# Patient Record
Sex: FEMALE | HISPANIC OR LATINO | Employment: OTHER | URBAN - METROPOLITAN AREA
[De-identification: names, ages, dates, MRNs, and addresses within clinical notes are randomized per-mention and may not be internally consistent; named-entity substitution may affect disease eponyms.]

---

## 2021-01-15 DIAGNOSIS — Z23 NEED FOR VACCINATION: ICD-10-CM

## 2021-11-15 ENCOUNTER — HOSPITAL ENCOUNTER (EMERGENCY)
Facility: MEDICAL CENTER | Age: 71
End: 2021-11-15
Attending: EMERGENCY MEDICINE
Payer: MEDICAID

## 2021-11-15 VITALS
HEART RATE: 90 BPM | DIASTOLIC BLOOD PRESSURE: 78 MMHG | TEMPERATURE: 97 F | BODY MASS INDEX: 24.77 KG/M2 | OXYGEN SATURATION: 98 % | RESPIRATION RATE: 16 BRPM | SYSTOLIC BLOOD PRESSURE: 118 MMHG | WEIGHT: 145.06 LBS | HEIGHT: 64 IN

## 2021-11-15 DIAGNOSIS — N76.4 LABIAL ABSCESS: ICD-10-CM

## 2021-11-15 DIAGNOSIS — N76.2 CELLULITIS OF LABIA: ICD-10-CM

## 2021-11-15 PROCEDURE — 99284 EMERGENCY DEPT VISIT MOD MDM: CPT

## 2021-11-15 PROCEDURE — 303977 HCHG I & D

## 2021-11-15 PROCEDURE — 700102 HCHG RX REV CODE 250 W/ 637 OVERRIDE(OP): Performed by: EMERGENCY MEDICINE

## 2021-11-15 PROCEDURE — 700101 HCHG RX REV CODE 250: Performed by: EMERGENCY MEDICINE

## 2021-11-15 PROCEDURE — A9270 NON-COVERED ITEM OR SERVICE: HCPCS | Performed by: EMERGENCY MEDICINE

## 2021-11-15 RX ORDER — SULFAMETHOXAZOLE AND TRIMETHOPRIM 800; 160 MG/1; MG/1
1 TABLET ORAL ONCE
Status: COMPLETED | OUTPATIENT
Start: 2021-11-15 | End: 2021-11-15

## 2021-11-15 RX ORDER — CEPHALEXIN 500 MG/1
500 CAPSULE ORAL 4 TIMES DAILY
Qty: 40 CAPSULE | Refills: 0 | Status: SHIPPED | OUTPATIENT
Start: 2021-11-15 | End: 2021-11-25

## 2021-11-15 RX ORDER — CEPHALEXIN 500 MG/1
500 CAPSULE ORAL ONCE
Status: COMPLETED | OUTPATIENT
Start: 2021-11-15 | End: 2021-11-15

## 2021-11-15 RX ORDER — SULFAMETHOXAZOLE AND TRIMETHOPRIM 800; 160 MG/1; MG/1
1 TABLET ORAL 2 TIMES DAILY
Qty: 20 TABLET | Refills: 0 | Status: SHIPPED | OUTPATIENT
Start: 2021-11-15 | End: 2021-11-25

## 2021-11-15 RX ORDER — HYDROCODONE BITARTRATE AND ACETAMINOPHEN 5; 325 MG/1; MG/1
1 TABLET ORAL EVERY 4 HOURS PRN
Qty: 15 TABLET | Refills: 0 | Status: SHIPPED | OUTPATIENT
Start: 2021-11-15 | End: 2021-11-16 | Stop reason: SDUPTHER

## 2021-11-15 RX ORDER — LIDOCAINE HYDROCHLORIDE AND EPINEPHRINE BITARTRATE 20; .01 MG/ML; MG/ML
10 INJECTION, SOLUTION SUBCUTANEOUS ONCE
Status: COMPLETED | OUTPATIENT
Start: 2021-11-15 | End: 2021-11-15

## 2021-11-15 RX ADMIN — SULFAMETHOXAZOLE AND TRIMETHOPRIM 1 TABLET: 800; 160 TABLET ORAL at 12:27

## 2021-11-15 RX ADMIN — LIDOCAINE HYDROCHLORIDE AND EPINEPHRINE 10 ML: 20; 10 INJECTION, SOLUTION INFILTRATION; PERINEURAL at 12:30

## 2021-11-15 RX ADMIN — CEPHALEXIN 500 MG: 500 CAPSULE ORAL at 12:27

## 2021-11-15 NOTE — ED PROVIDER NOTES
"ED Provider Note    Scribed for Dg Obrien M.D. by Eileen Foreman. 11/15/2021  12:05 PM    Primary care provider: Pcp Pt States None  Means of arrival: Walk-in  History obtained from: Patient  History limited by: None    CHIEF COMPLAINT  Chief Complaint   Patient presents with    Lump     to vagina, noticed it 2 days ago, feels like it's getting bigger       HPI  Yang Galeas is a 71 y.o. female who presents to the Emergency Department for evaluation of a lump on her right labia. This started two days ago and has been increasing in size. She states that it appears filled with pus. No fevers. There are no known alleviating or exacerbating factors.     REVIEW OF SYSTEMS  Pertinent positives include right labia lump.   Pertinent negatives include no fevers.    All other systems reviewed and negative. See HPI for further details.     PAST MEDICAL HISTORY   has a past medical history of Anxiety and Asthma.    SURGICAL HISTORY   has a past surgical history that includes gyn surgery.    SOCIAL HISTORY  Social History     Tobacco Use    Smoking status: Never Smoker    Smokeless tobacco: Never Used   Substance Use Topics    Alcohol use: Yes     Comment: stopped 3 days ago    Drug use: Yes     Types: Inhaled     Comment: marijuana, stopped 3 days ago      Social History     Substance and Sexual Activity   Drug Use Yes    Types: Inhaled    Comment: marijuana, stopped 3 days ago       FAMILY HISTORY  History reviewed. No pertinent family history.    CURRENT MEDICATIONS  Home Medications       Reviewed by Lorna Luis R.N. (Registered Nurse) on 11/15/21 at 1223  Med List Status: Partial     Medication Last Dose Status        Patient Micah Taking any Medications                           ALLERGIES  No Known Allergies    PHYSICAL EXAM  VITAL SIGNS: /80   Pulse 93   Temp 36.3 °C (97.4 °F) (Temporal)   Resp 16   Ht 1.626 m (5' 4\")   Wt 65.8 kg (145 lb 1 oz)   SpO2 98%   BMI 24.90 kg/m²     Nursing note " and vitals reviewed.  Constitutional: Well-developed and well-nourished. No distress.   HENT: Head is normocephalic and atraumatic. Oropharynx is clear and moist without exudate or erythema.   Eyes: Pupils are equal, round, and reactive to light. Conjunctiva are normal.   Cardiovascular: Normal rate and regular rhythm. No murmur heard. Normal radial pulses.  Pulmonary/Chest: Breath sounds normal. No wheezes or rales.   Abdominal: Soft and non-tender. No distention    : Right labia is erythematous and swollen with palpable fluctuance, along the upper portion of the labia majora.  There is induration of the lower labia majora extending to the buttock.  There is no fluctuance in this area.  Musculoskeletal: Extremities exhibit normal range of motion without edema or tenderness.   Neurological: Awake, alert and oriented to person, place, and time. No focal deficits noted.  Skin: Skin is warm and dry. No rash.   Psychiatric: Normal mood and affect. Appropriate for clinical situation.    DIAGNOSTIC STUDIES / PROCEDURES    Incision and Drainage Procedure    Indication: Abscess    Location: Right labia majora    Procedure: The patient was positioned appropriately and the skin over the incision site was prepped with chlorhexidine. Local anesthesia was obtained by infiltration using 2% Lidocaine with epinephrine.  An incision was then made over the greatest area of fluctuance and approximately 2 cc of purulent and bloody material was expressed. Loculations were not present. There was then no significant abscess cavity to allow packing placement.  The patient tolerated the procedure well.    Complications: None    COURSE & MEDICAL DECISION MAKING  Nursing notes, VS, PMSFHx reviewed in chart.     12:05 PM - Patient seen and examined at bedside. Informed her that she will need an incision and drainage and she is agreeable.    12:17 PM - Performed incision and drainage as above. Patient will be treated with Bactrim and Keflex.  Discussed discharge instructions and return precautions with the patient and they were cleared for discharge. Patient was given the opportunity to ask any further questions. She is comfortable with discharge at this time.      Patient presents today with a right labial abscess.  This was I indeed.  There was less return of purulent material than expected.  Seems to be primarily induration.  Not able to be packed.  Local dressing is applied.  Patient is treated with Bactrim and Keflex.    I reviewed prescription monitoring program for patient's narcotic use before prescribing a scheduled drug.The patient will not drink alcohol nor drive with prescribed medications. The patient will return for new or worsening symptoms and is stable at the time of discharge.    The patient is referred to a primary physician for blood pressure management, diabetic screening, and for all other preventative health concerns.    In prescribing controlled substances to this patient, I certify that I have obtained and reviewed the medical history of Yang Galeas. I have also made a good anna effort to obtain applicable records from other providers who have treated the patient and records did not demonstrate any increased risk of substance abuse that would prevent me from prescribing controlled substances.     I have conducted a physical exam and documented it. I have reviewed Ms. Galeas’s prescription history as maintained by the [place] Prescription Monitoring Program.     I have assessed the patient’s risk for abuse, dependency, and addiction using the validated Opioid Risk Tool available at https://www.mdcalc.com/fqvcnc-ayvz-yxiy-ort-narcotic-abuse.     Given the above, I believe the benefits of controlled substance therapy outweigh the risks. The reasons for prescribing controlled substances include non-narcotic, oral analgesic alternatives have been inadequate for pain control. Accordingly, I have discussed the risk and benefits,  treatment plan, and alternative therapies with the patient.     DISPOSITION:  Patient will be discharged home in stable condition.    FOLLOW UP:  [facility], Emergency Dept  [address]  [place] [address] [phone]    If symptoms worsen      OUTPATIENT MEDICATIONS:  New Prescriptions    CEPHALEXIN (KEFLEX) 500 MG CAP    Take 1 Capsule by mouth 4 times a day for 10 days.    HYDROCODONE-ACETAMINOPHEN (NORCO) 5-325 MG TAB PER TABLET    Take 1 Tablet by mouth every four hours as needed for up to 5 days.    SULFAMETHOXAZOLE-TRIMETHOPRIM (BACTRIM DS) 800-160 MG TABLET    Take 1 Tablet by mouth 2 times a day for 10 days.       FINAL IMPRESSION  1. Labial abscess    2. Cellulitis of labia       I&D procedure performed by VALARIE.     Eileen OATES ([place]), am scribing for, and in the presence of, Dg Obrien M.D..    Electronically signed by: Eileen Cid), 11/15/2021    Dg OATES M.D. personally performed the services described in this documentation, as scribed by Eileen Foreman in my presence, and it is both accurate and complete.    The note accurately reflects work and decisions made by me.  Dg Obrien M.D.  11/15/2021  1:54 PM

## 2021-11-15 NOTE — ED TRIAGE NOTES
Ambulates to triage  Chief Complaint   Patient presents with   • Lump     to vagina, noticed it 2 days ago, feels like it's getting bigger     Pt said it started out looking like a pimple, but has gotten larger and looks like it's filled with pus. No drainage per pt.

## 2021-11-16 RX ORDER — HYDROCODONE BITARTRATE AND ACETAMINOPHEN 5; 325 MG/1; MG/1
1 TABLET ORAL EVERY 4 HOURS PRN
Qty: 15 TABLET | Refills: 0 | Status: SHIPPED | OUTPATIENT
Start: 2021-11-16 | End: 2021-11-21

## 2022-02-27 ENCOUNTER — HOSPITAL ENCOUNTER (EMERGENCY)
Facility: MEDICAL CENTER | Age: 72
End: 2022-02-27
Attending: EMERGENCY MEDICINE
Payer: MEDICAID

## 2022-02-27 ENCOUNTER — APPOINTMENT (OUTPATIENT)
Dept: RADIOLOGY | Facility: MEDICAL CENTER | Age: 72
End: 2022-02-27
Attending: EMERGENCY MEDICINE
Payer: MEDICAID

## 2022-02-27 VITALS
TEMPERATURE: 98 F | DIASTOLIC BLOOD PRESSURE: 69 MMHG | OXYGEN SATURATION: 97 % | HEIGHT: 61 IN | WEIGHT: 157 LBS | BODY MASS INDEX: 29.64 KG/M2 | HEART RATE: 83 BPM | SYSTOLIC BLOOD PRESSURE: 136 MMHG | RESPIRATION RATE: 16 BRPM

## 2022-02-27 DIAGNOSIS — M25.552 LEFT HIP PAIN: ICD-10-CM

## 2022-02-27 DIAGNOSIS — M25.512 ACUTE PAIN OF LEFT SHOULDER: ICD-10-CM

## 2022-02-27 DIAGNOSIS — Z59.00 HOMELESSNESS: ICD-10-CM
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
## 2022-02-27 LAB
ALBUMIN SERPL BCP-MCNC: 4.1 G/DL (ref 3.2–4.9)
ALBUMIN/GLOB SERPL: 1.3 G/DL
ALP SERPL-CCNC: 108 U/L (ref 30–99)
ALT SERPL-CCNC: 13 U/L (ref 2–50)
ANION GAP SERPL CALC-SCNC: 13 MMOL/L (ref 7–16)
APPEARANCE UR: CLEAR
AST SERPL-CCNC: 24 U/L (ref 12–45)
BACTERIA #/AREA URNS HPF: NEGATIVE /HPF
BASOPHILS # BLD AUTO: 0.5 % (ref 0–1.8)
BASOPHILS # BLD: 0.05 K/UL (ref 0–0.12)
BILIRUB SERPL-MCNC: 0.5 MG/DL (ref 0.1–1.5)
BILIRUB UR QL STRIP.AUTO: NEGATIVE
BUN SERPL-MCNC: 25 MG/DL (ref 8–22)
CALCIUM SERPL-MCNC: 9.3 MG/DL (ref 8.5–10.5)
CHLORIDE SERPL-SCNC: 105 MMOL/L (ref 96–112)
CO2 SERPL-SCNC: 22 MMOL/L (ref 20–33)
COLOR UR: YELLOW
CREAT SERPL-MCNC: 0.87 MG/DL (ref 0.5–1.4)
EKG IMPRESSION: NORMAL
EOSINOPHIL # BLD AUTO: 0.25 K/UL (ref 0–0.51)
EOSINOPHIL NFR BLD: 2.6 % (ref 0–6.9)
EPI CELLS #/AREA URNS HPF: NEGATIVE /HPF
ERYTHROCYTE [DISTWIDTH] IN BLOOD BY AUTOMATED COUNT: 43.9 FL (ref 35.9–50)
GLOBULIN SER CALC-MCNC: 3.1 G/DL (ref 1.9–3.5)
GLUCOSE SERPL-MCNC: 81 MG/DL (ref 65–99)
GLUCOSE UR STRIP.AUTO-MCNC: NEGATIVE MG/DL
HCT VFR BLD AUTO: 38 % (ref 37–47)
HGB BLD-MCNC: 12.5 G/DL (ref 12–16)
HYALINE CASTS #/AREA URNS LPF: ABNORMAL /LPF
IMM GRANULOCYTES # BLD AUTO: 0.03 K/UL (ref 0–0.11)
IMM GRANULOCYTES NFR BLD AUTO: 0.3 % (ref 0–0.9)
KETONES UR STRIP.AUTO-MCNC: NEGATIVE MG/DL
LEUKOCYTE ESTERASE UR QL STRIP.AUTO: ABNORMAL
LYMPHOCYTES # BLD AUTO: 3.06 K/UL (ref 1–4.8)
LYMPHOCYTES NFR BLD: 32.1 % (ref 22–41)
MCH RBC QN AUTO: 29.4 PG (ref 27–33)
MCHC RBC AUTO-ENTMCNC: 32.9 G/DL (ref 33.6–35)
MCV RBC AUTO: 89.4 FL (ref 81.4–97.8)
MICRO URNS: ABNORMAL
MONOCYTES # BLD AUTO: 1.06 K/UL (ref 0–0.85)
MONOCYTES NFR BLD AUTO: 11.1 % (ref 0–13.4)
NEUTROPHILS # BLD AUTO: 5.07 K/UL (ref 2–7.15)
NEUTROPHILS NFR BLD: 53.4 % (ref 44–72)
NITRITE UR QL STRIP.AUTO: NEGATIVE
NRBC # BLD AUTO: 0 K/UL
NRBC BLD-RTO: 0 /100 WBC
PH UR STRIP.AUTO: 5.5 [PH] (ref 5–8)
PLATELET # BLD AUTO: 252 K/UL (ref 164–446)
PMV BLD AUTO: 9.8 FL (ref 9–12.9)
POTASSIUM SERPL-SCNC: 3.9 MMOL/L (ref 3.6–5.5)
PROT SERPL-MCNC: 7.2 G/DL (ref 6–8.2)
PROT UR QL STRIP: NEGATIVE MG/DL
RBC # BLD AUTO: 4.25 M/UL (ref 4.2–5.4)
RBC # URNS HPF: ABNORMAL /HPF
RBC UR QL AUTO: NEGATIVE
SODIUM SERPL-SCNC: 140 MMOL/L (ref 135–145)
SP GR UR STRIP.AUTO: 1.03
UROBILINOGEN UR STRIP.AUTO-MCNC: 0.2 MG/DL
WBC # BLD AUTO: 9.5 K/UL (ref 4.8–10.8)
WBC #/AREA URNS HPF: ABNORMAL /HPF

## 2022-02-27 PROCEDURE — 99284 EMERGENCY DEPT VISIT MOD MDM: CPT

## 2022-02-27 PROCEDURE — 93005 ELECTROCARDIOGRAM TRACING: CPT | Performed by: EMERGENCY MEDICINE

## 2022-02-27 PROCEDURE — 85025 COMPLETE CBC W/AUTO DIFF WBC: CPT

## 2022-02-27 PROCEDURE — 36415 COLL VENOUS BLD VENIPUNCTURE: CPT

## 2022-02-27 PROCEDURE — 73030 X-RAY EXAM OF SHOULDER: CPT | Mod: LT

## 2022-02-27 PROCEDURE — 80053 COMPREHEN METABOLIC PANEL: CPT

## 2022-02-27 PROCEDURE — 72170 X-RAY EXAM OF PELVIS: CPT

## 2022-02-27 PROCEDURE — 81001 URINALYSIS AUTO W/SCOPE: CPT

## 2022-02-27 SDOH — ECONOMIC STABILITY - HOUSING INSECURITY: HOMELESSNESS UNSPECIFIED: Z59.00

## 2022-02-27 ASSESSMENT — LIFESTYLE VARIABLES: DO YOU DRINK ALCOHOL: NO

## 2022-02-27 NOTE — ED TRIAGE NOTES
"Chief Complaint   Patient presents with   • T-5000 FALL     Pt reports that she fell Monday landing on left hip, since she has had pain. Reports that she has been in bed since but is able to walk with a cane for shirt distances.      Pt wheeled to triage with above complaint.    /85   Pulse 96   Temp 36.7 °C (98.1 °F) (Temporal)   Resp 16   Ht 1.549 m (5' 1\")   Wt 71.2 kg (157 lb)   SpO2 95%   Pt informed of wait times. Educated on triage process.  Asked to return to triage RN for any new or worsening of symptoms. Thanked for patience.        "

## 2022-02-28 NOTE — ED NOTES
Patient to [facility] via wheelchair with granddaughter at bedside.  Patient reports slipped and fell on ice resulting in L hip pain; patient has cane at bedside, requested education for cane use, patient and family educated on use, reports understanding, requesting FWW for ambulation.

## 2022-02-28 NOTE — ED PROVIDER NOTES
ED Provider Note    CHIEF COMPLAINT  Chief Complaint   Patient presents with   • T-5000 FALL     Pt reports that she fell Monday landing on left hip, since she has had pain. Reports that she has been in bed since but is able to walk with a cane for shirt distances.      HPI  Patient is a patient is a 72-year-old female with a past medical history of anxiety and asthma who presents emergency room for evaluation of injury sustained from a ground-level fall.  Patient states that she intermittently lives with her daughter and while she was at the [employer] last week she slipped, falling onto her left hip and left shoulder causing pain and discomfort that was initially severe though throughout the week and has been getting somewhat improved.  She has had some swelling in both of these joints, has pain with movement, and came in for further evaluation as she does not improving with OTC medications.  She says at the time she thought she may have hit her head but she has not had any sustained headaches, no visual changes, neck pain or stiffness.  She has not had any persistent weakness since.  She denies any dysuria, denies any palpitations around the time of the fall and believes it was purely mechanical in the eyes.    PPE Note: I personally donned full PPE for all patient encounters during this visit, including being clean-shaven with an N95 respirator mask, gloves, and goggles.     REVIEW OF SYSTEMS  See HPI for further details. All other systems are negative.     PAST MEDICAL HISTORY   has a past medical history of Anxiety and Asthma.    SOCIAL HISTORY  Social History     Tobacco Use   • Smoking status: Never Smoker   • Smokeless tobacco: Never Used   Substance and Sexual Activity   • Alcohol use: Yes     Comment: stopped 3 days ago   • Drug use: Yes     Types: Inhaled     Comment: marijuana, stopped 3 days ago   • Sexual activity: Not on file       SURGICAL HISTORY   has a past surgical history that includes gyn  "surgery.    CURRENT MEDICATIONS  Home Medications     Reviewed by Mary Valladares R.N. (Registered Nurse) on 02/27/22 at 1521  Med List Status: Partial   Medication Last Dose Status        Patient Micah Taking any Medications                     ALLERGIES  No Known Allergies    PHYSICAL EXAM  VITAL SIGNS: /69   Pulse 83   Temp 36.7 °C (98 °F) (Temporal)   Resp 16   Ht 1.549 m (5' 1\")   Wt 71.2 kg (157 lb)   SpO2 97%   BMI 29.66 kg/m²   Pulse ox interpretation: I interpret this pulse ox as normal.  Genl: F sitting in gurney comfortably, speaking clearly, appears in no acute distress  Head: NC/AT   ENT: Mucous membranes moist, posterior pharynx clear, uvula midline, nares patent bilaterally   Eyes: Normal sclera, pupils equal round reactive to light  Neck: Supple, FROM, no LAD appreciated   Pulmonary: Lungs are clear to auscultation bilaterally  Chest: No TTP  CV:  RRR, no murmur appreciated, pulses 2+ in both upper and lower extremities,  Abdomen: soft, suprapubic pain, ND; no rebound/guarding, no masses palpated, no HSM   : no CVA tenderness   Musculoskeletal: Pain free ROM of the neck. Moving upper and lower extremities and spontaneous in coordinated fashion  Left Upper Extremity:  - Skin: Tenderness to palpation over the distal portion of the AC, and the entirety of the glenohumeral joint though this is not consistent with repeat exams.  No abrasions, no lacerations, no ecchymosis  - Motor: Full ROM at shoulder, elbow, wrist; 5/5 wrist flexion/extension, thumb IP joint flexion/extension (AIN/PIN), abduction/adduction (ulnar) intact  - Sensation intact to median/ulnar/radial nerves  - 2+ radial pulse, < 2 sec cap refill x 5 digits  Left Lower Extremity  - Skin: No shortening of the extremity, there is tenderness over the lateral aspect of the trochanter, there is some soft tissue contusions and nonspecific soft tissue tenderness.  Pain with active range of motion testing only.  No abrasions, " lacerations or ecchymosis  - Motor: Full ROM at knee and ankle; 5/5 ankle dorsal/plantar flexion, EHL/FHL intact  - Sensation intact to superficial/deep peroneal, tibial, saphenous, sural nerves  - 2+ dorsalis pedis and posterior tibialis, cap refill < 2 seconds x 5 digits  Neuro: A&Ox4 (person, place, time, situation), speech fluent, gait not initially assessed, no focal deficits appreciated  Psych: Patient has an appropriate affect and behavior  Skin: No rash or lesions.  No pallor or jaundice.  No cyanosis.  Warm and dry.     DIAGNOSTIC STUDIES / PROCEDURES    EKG  Results for orders placed or performed during the hospital encounter of 22   EKG   Result Value Ref Range    Report       [facility] Emergency Dept.    Test Date:  2022  Pt Name:    PABLO RILEY              Department: ER  MRN:        [id number]                      Room:       [facility]  Gender:     Female                       Technician: [id number]  :        [id number]                   Requested By:RADHA SPARKS  Order #:    [id number]                    Reading MD: Otis Swartz MD    Measurements  Intervals                                Axis  Rate:       77                           P:          43  NE:         180                          QRS:        17  QRSD:       90                           T:          27  QT:         404  QTc:        458    Interpretive Statements  SINUS RHYTHM, rate of 77, normal intervals, normal axis, no acute ischemia or  infarction.  No previous ECG available for comparison  Electronically Signed On 2022 18:39:47 PST by Otis Swartz MD       LABS  Labs Reviewed   CBC WITH DIFFERENTIAL - Abnormal; Notable for the following components:       Result Value    MCHC 32.9 (*)     Monos (Absolute) 1.06 (*)     All other components within normal limits   COMP METABOLIC PANEL - Abnormal; Notable for the following components:    Bun 25 (*)     Alkaline Phosphatase 108 (*)     All other components within  normal limits   URINALYSIS - Abnormal; Notable for the following components:    Leukocyte Esterase Small (*)     All other components within normal limits   URINE MICROSCOPIC (W/UA) - Abnormal; Notable for the following components:    WBC 5-10 (*)     Hyaline Cast 3-5 (*)     All other components within normal limits   ESTIMATED GFR     RADIOLOGY  DX-SHOULDER 2+ LEFT   Final Result      No fracture or dislocation of LEFT shoulder.      DX-PELVIS-1 OR 2 VIEWS   Final Result      No pelvic fracture.        COURSE & MEDICAL DECISION MAKING  Pertinent Labs & Imaging studies reviewed. (See chart for details)    DDX: Hip fracture, UTI, pelvic injury, shoulder fracture, muscular contusion    MDM    Initial evaluation at 1633:  Patient presents emergency room for symptoms as described above. She presented with these symptoms of hip and shoulder pain within the last week and does not have any external signs of trauma on the head and neck exam. She has a normal mental status and I have very minimal concerns for intracranial bleed or subdural hematoma. She is not on blood thinners. She had point tenderness in some of the soft tissue structures around the left-sided hip and shoulder joint.    Lab work shows no leukocytosis or anemia. There is no kidney injury or gross electrolyte derangement. Shoulder x-ray shows no evidence of acute traumatic injury or old fracture. Pelvis imaging is without any acute fractures.    Patient is able to stand at the bedside with me, she does not have uncontrolled pain and did not require advanced pain medications. Urinalysis was also obtained and shows no infectious or stone findings    The patient ultimately has stable vital signs, no febrile illness and injury sustained from a fall that appears to be mostly soft tissue contusions or possible bone bruises. She is counseled regarding these findings, the need for establishing with an outpatient physician she be discharged home in stable  condition.    FINAL IMPRESSION  Visit Diagnoses     ICD-10-CM   1. Fall, initial encounter  W19.XXXA   2. Acute pain of left shoulder  M25.512   3. Left hip pain  M25.552   4. Homelessness  Z59.00        Electronically signed by: Maxime Berg M.D., 2/27/2022 4:33 PM

## 2022-02-28 NOTE — DISCHARGE PLANNING
"Medical Social Work    MSW spoke with bedside RN who states that pt currently stays with family but they are moving out of state and pt will be homeless.  MSW met with pt and family at bedside.  Pt was provided with homeless resources.  This worker discussed [facility] with pt and she states that she's stayed with them previously and knows the process.  Pt states that she also has a  with [facility] that she is working with.  Pt states that she makes just over $800 and \"wants to be in an old folks home\".  Pt was advised to continue working with her .  Pt is also aware of [facility] if needed.  All questions answered.  Bedside RN aware.  "

## 2022-04-30 ENCOUNTER — APPOINTMENT (OUTPATIENT)
Dept: RADIOLOGY | Facility: MEDICAL CENTER | Age: 72
End: 2022-04-30
Attending: EMERGENCY MEDICINE
Payer: MEDICAID

## 2022-04-30 ENCOUNTER — HOSPITAL ENCOUNTER (EMERGENCY)
Facility: MEDICAL CENTER | Age: 72
End: 2022-04-30
Attending: EMERGENCY MEDICINE
Payer: MEDICAID

## 2022-04-30 VITALS
HEIGHT: 61 IN | OXYGEN SATURATION: 92 % | WEIGHT: 156.09 LBS | DIASTOLIC BLOOD PRESSURE: 67 MMHG | TEMPERATURE: 97.2 F | BODY MASS INDEX: 29.47 KG/M2 | SYSTOLIC BLOOD PRESSURE: 145 MMHG | RESPIRATION RATE: 18 BRPM | HEART RATE: 82 BPM

## 2022-04-30 DIAGNOSIS — S20.212A CONTUSION OF LEFT CHEST WALL, INITIAL ENCOUNTER: ICD-10-CM

## 2022-04-30 PROCEDURE — 700102 HCHG RX REV CODE 250 W/ 637 OVERRIDE(OP): Performed by: EMERGENCY MEDICINE

## 2022-04-30 PROCEDURE — A9270 NON-COVERED ITEM OR SERVICE: HCPCS | Performed by: EMERGENCY MEDICINE

## 2022-04-30 PROCEDURE — 71045 X-RAY EXAM CHEST 1 VIEW: CPT

## 2022-04-30 PROCEDURE — 99283 EMERGENCY DEPT VISIT LOW MDM: CPT

## 2022-04-30 PROCEDURE — 73130 X-RAY EXAM OF HAND: CPT | Mod: RT

## 2022-04-30 RX ORDER — ACETAMINOPHEN 325 MG/1
650 TABLET ORAL ONCE
Status: COMPLETED | OUTPATIENT
Start: 2022-04-30 | End: 2022-04-30

## 2022-04-30 RX ADMIN — ACETAMINOPHEN 650 MG: 325 TABLET, FILM COATED ORAL at 13:44

## 2022-04-30 ASSESSMENT — FIBROSIS 4 INDEX: FIB4 SCORE: 1.93
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
## 2022-08-24 ENCOUNTER — HOSPITAL ENCOUNTER (EMERGENCY)
Facility: MEDICAL CENTER | Age: 72
End: 2022-08-24
Attending: EMERGENCY MEDICINE
Payer: MEDICARE

## 2022-08-24 VITALS
HEART RATE: 91 BPM | RESPIRATION RATE: 16 BRPM | DIASTOLIC BLOOD PRESSURE: 71 MMHG | OXYGEN SATURATION: 96 % | HEIGHT: 63 IN | WEIGHT: 177 LBS | BODY MASS INDEX: 31.36 KG/M2 | TEMPERATURE: 98 F | SYSTOLIC BLOOD PRESSURE: 138 MMHG

## 2022-08-24 DIAGNOSIS — R52 BODY ACHES: ICD-10-CM

## 2022-08-24 DIAGNOSIS — Z76.0 MEDICATION REFILL: ICD-10-CM

## 2022-08-24 PROCEDURE — 99284 EMERGENCY DEPT VISIT MOD MDM: CPT

## 2022-08-24 PROCEDURE — 700111 HCHG RX REV CODE 636 W/ 250 OVERRIDE (IP): Performed by: EMERGENCY MEDICINE

## 2022-08-24 PROCEDURE — 96374 THER/PROPH/DIAG INJ IV PUSH: CPT

## 2022-08-24 RX ORDER — FLUOXETINE 10 MG/1
10 CAPSULE ORAL EVERY MORNING
Status: SHIPPED | COMMUNITY
End: 2022-08-24 | Stop reason: SDUPTHER

## 2022-08-24 RX ORDER — ARIPIPRAZOLE 10 MG/1
10 TABLET ORAL EVERY EVENING
Qty: 30 TABLET | Refills: 0 | Status: SHIPPED | OUTPATIENT
Start: 2022-08-24

## 2022-08-24 RX ORDER — KETOROLAC TROMETHAMINE 30 MG/ML
15 INJECTION, SOLUTION INTRAMUSCULAR; INTRAVENOUS ONCE
Status: COMPLETED | OUTPATIENT
Start: 2022-08-24 | End: 2022-08-24

## 2022-08-24 RX ORDER — IBUPROFEN 800 MG/1
800 TABLET ORAL 3 TIMES DAILY
Qty: 30 TABLET | Refills: 0 | Status: SHIPPED | OUTPATIENT
Start: 2022-08-24

## 2022-08-24 RX ORDER — TRAZODONE HYDROCHLORIDE 50 MG/1
50 TABLET ORAL
Qty: 30 TABLET | Refills: 0 | Status: SHIPPED | OUTPATIENT
Start: 2022-08-24

## 2022-08-24 RX ORDER — ARIPIPRAZOLE 10 MG/1
10 TABLET ORAL EVERY EVENING
Status: SHIPPED | COMMUNITY
End: 2022-08-24 | Stop reason: SDUPTHER

## 2022-08-24 RX ORDER — TRAZODONE HYDROCHLORIDE 50 MG/1
50 TABLET ORAL
Status: SHIPPED | COMMUNITY
End: 2022-08-24 | Stop reason: SDUPTHER

## 2022-08-24 RX ORDER — IBUPROFEN 800 MG/1
800 TABLET ORAL 3 TIMES DAILY
Status: SHIPPED | COMMUNITY
End: 2022-08-24 | Stop reason: SDUPTHER

## 2022-08-24 RX ORDER — FLUOXETINE 10 MG/1
10 CAPSULE ORAL EVERY MORNING
Qty: 30 CAPSULE | Refills: 0 | Status: SHIPPED | OUTPATIENT
Start: 2022-08-24

## 2022-08-24 RX ADMIN — KETOROLAC TROMETHAMINE 15 MG: 30 INJECTION, SOLUTION INTRAMUSCULAR at 08:33

## 2022-08-24 ASSESSMENT — FIBROSIS 4 INDEX: FIB4 SCORE: 1.93
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
## 2022-08-24 NOTE — ED NOTES
Bus pass given by BROOKE. Discharge orders received, IV and monitor discontinued, instructions and education given, follow-up discussed, prescriptions sent to pharmacy, pt verbalized understanding. Pt ambulated out to front lobby.

## 2022-08-24 NOTE — ED NOTES
Pharmacy Medication Reconciliation    ~Medication Reconciliation partially completed per patient at bedside  ~Allergies reviewed   ~No oral ABX within the last 30 days  ~[facility] home pharmacy: [facility]    ~Med rec to follow up with patient home pharmacy when they reopen to verify current strengths of medications

## 2022-08-24 NOTE — DISCHARGE PLANNING
BROOKE contacted [facility] to see if she can return. BROOKE spoke with [facility]  who reported that the patient can return to her same top bunk bed. There are no bottom bunk beds available. Patient informed the patient, she agreed to sleeping on the top bunk. SW also provided the patient with a bus pass.
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
## 2022-12-29 ENCOUNTER — APPOINTMENT (OUTPATIENT)
Dept: RADIOLOGY | Facility: MEDICAL CENTER | Age: 72
End: 2022-12-29
Attending: EMERGENCY MEDICINE
Payer: MEDICARE

## 2022-12-29 ENCOUNTER — HOSPITAL ENCOUNTER (EMERGENCY)
Facility: MEDICAL CENTER | Age: 72
End: 2022-12-29
Attending: EMERGENCY MEDICINE
Payer: MEDICARE

## 2022-12-29 VITALS
SYSTOLIC BLOOD PRESSURE: 128 MMHG | HEART RATE: 70 BPM | OXYGEN SATURATION: 98 % | RESPIRATION RATE: 18 BRPM | HEIGHT: 62 IN | DIASTOLIC BLOOD PRESSURE: 65 MMHG | BODY MASS INDEX: 30.02 KG/M2 | TEMPERATURE: 97.4 F | WEIGHT: 163.14 LBS

## 2022-12-29 DIAGNOSIS — S09.90XA CLOSED HEAD INJURY, INITIAL ENCOUNTER: ICD-10-CM

## 2022-12-29 DIAGNOSIS — S16.1XXA STRAIN OF NECK MUSCLE, INITIAL ENCOUNTER: ICD-10-CM

## 2022-12-29 DIAGNOSIS — R07.81 RIB PAIN ON RIGHT SIDE: ICD-10-CM
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
## 2022-12-29 LAB
ALBUMIN SERPL BCP-MCNC: 4 G/DL (ref 3.2–4.9)
ALBUMIN/GLOB SERPL: 1.3 G/DL
ALP SERPL-CCNC: 151 U/L (ref 30–99)
ALT SERPL-CCNC: 12 U/L (ref 2–50)
ANION GAP SERPL CALC-SCNC: 8 MMOL/L (ref 7–16)
AST SERPL-CCNC: 18 U/L (ref 12–45)
BASOPHILS # BLD AUTO: 0.6 % (ref 0–1.8)
BASOPHILS # BLD: 0.05 K/UL (ref 0–0.12)
BILIRUB SERPL-MCNC: 0.3 MG/DL (ref 0.1–1.5)
BUN SERPL-MCNC: 21 MG/DL (ref 8–22)
CALCIUM ALBUM COR SERPL-MCNC: 9 MG/DL (ref 8.5–10.5)
CALCIUM SERPL-MCNC: 9 MG/DL (ref 8.5–10.5)
CHLORIDE SERPL-SCNC: 109 MMOL/L (ref 96–112)
CO2 SERPL-SCNC: 23 MMOL/L (ref 20–33)
CREAT SERPL-MCNC: 0.77 MG/DL (ref 0.5–1.4)
EOSINOPHIL # BLD AUTO: 0.37 K/UL (ref 0–0.51)
EOSINOPHIL NFR BLD: 4.2 % (ref 0–6.9)
ERYTHROCYTE [DISTWIDTH] IN BLOOD BY AUTOMATED COUNT: 42.8 FL (ref 35.9–50)
GFR SERPLBLD CREATININE-BSD FMLA CKD-EPI: 81 ML/MIN/1.73 M 2
GLOBULIN SER CALC-MCNC: 3 G/DL (ref 1.9–3.5)
GLUCOSE SERPL-MCNC: 90 MG/DL (ref 65–99)
HCT VFR BLD AUTO: 38.8 % (ref 37–47)
HGB BLD-MCNC: 12.7 G/DL (ref 12–16)
IMM GRANULOCYTES # BLD AUTO: 0.02 K/UL (ref 0–0.11)
IMM GRANULOCYTES NFR BLD AUTO: 0.2 % (ref 0–0.9)
LYMPHOCYTES # BLD AUTO: 3.07 K/UL (ref 1–4.8)
LYMPHOCYTES NFR BLD: 34.7 % (ref 22–41)
MCH RBC QN AUTO: 29.3 PG (ref 27–33)
MCHC RBC AUTO-ENTMCNC: 32.7 G/DL (ref 33.6–35)
MCV RBC AUTO: 89.6 FL (ref 81.4–97.8)
MONOCYTES # BLD AUTO: 0.88 K/UL (ref 0–0.85)
MONOCYTES NFR BLD AUTO: 10 % (ref 0–13.4)
NEUTROPHILS # BLD AUTO: 4.45 K/UL (ref 2–7.15)
NEUTROPHILS NFR BLD: 50.3 % (ref 44–72)
NRBC # BLD AUTO: 0 K/UL
NRBC BLD-RTO: 0 /100 WBC
PLATELET # BLD AUTO: 197 K/UL (ref 164–446)
PMV BLD AUTO: 10.8 FL (ref 9–12.9)
POTASSIUM SERPL-SCNC: 3.8 MMOL/L (ref 3.6–5.5)
PROT SERPL-MCNC: 7 G/DL (ref 6–8.2)
RBC # BLD AUTO: 4.33 M/UL (ref 4.2–5.4)
SODIUM SERPL-SCNC: 140 MMOL/L (ref 135–145)
WBC # BLD AUTO: 8.8 K/UL (ref 4.8–10.8)

## 2022-12-29 PROCEDURE — 80053 COMPREHEN METABOLIC PANEL: CPT

## 2022-12-29 PROCEDURE — 36415 COLL VENOUS BLD VENIPUNCTURE: CPT

## 2022-12-29 PROCEDURE — 93005 ELECTROCARDIOGRAM TRACING: CPT

## 2022-12-29 PROCEDURE — A9270 NON-COVERED ITEM OR SERVICE: HCPCS | Performed by: EMERGENCY MEDICINE

## 2022-12-29 PROCEDURE — 71045 X-RAY EXAM CHEST 1 VIEW: CPT

## 2022-12-29 PROCEDURE — 700102 HCHG RX REV CODE 250 W/ 637 OVERRIDE(OP): Performed by: EMERGENCY MEDICINE

## 2022-12-29 PROCEDURE — 73030 X-RAY EXAM OF SHOULDER: CPT | Mod: LT

## 2022-12-29 PROCEDURE — 85025 COMPLETE CBC W/AUTO DIFF WBC: CPT

## 2022-12-29 PROCEDURE — 72125 CT NECK SPINE W/O DYE: CPT

## 2022-12-29 PROCEDURE — 70450 CT HEAD/BRAIN W/O DYE: CPT

## 2022-12-29 PROCEDURE — 99285 EMERGENCY DEPT VISIT HI MDM: CPT

## 2022-12-29 PROCEDURE — 93005 ELECTROCARDIOGRAM TRACING: CPT | Performed by: EMERGENCY MEDICINE

## 2022-12-29 RX ORDER — ACETAMINOPHEN 500 MG
500-1000 TABLET ORAL EVERY 6 HOURS PRN
Qty: 30 TABLET | Refills: 0 | Status: SHIPPED | OUTPATIENT
Start: 2022-12-29 | End: 2023-01-08

## 2022-12-29 RX ORDER — ACETAMINOPHEN 500 MG
1000 TABLET ORAL ONCE
Status: COMPLETED | OUTPATIENT
Start: 2022-12-29 | End: 2022-12-29

## 2022-12-29 RX ADMIN — ACETAMINOPHEN 1000 MG: 500 TABLET ORAL at 13:39

## 2022-12-29 ASSESSMENT — FIBROSIS 4 INDEX: FIB4 SCORE: 1.93

## 2022-12-29 NOTE — ED NOTES
Pt provided with discharge instructions. Pt had no further questions. Pt ambulated to [facility]

## 2022-12-29 NOTE — ED PROVIDER NOTES
"  ER Provider Note    Scribed for Justin Sumner MD by Radha Mercedes. 12/29/2022  1:28 PM    Primary Care Provider: Pcp Pt States None  Means of Arrival: Walk-in  History obtained from: Patient    CHIEF COMPLAINT  Chief Complaint   Patient presents with    Shoulder Injury     Patient accidentally fell while pushing her shopping cart over a speed bump. Patient states she fell onto her right side and presents with right sided shoulder, back, and hip pain. Endorses hitting head when she fell. Denies Daily blood thinners or daily aspirin use. Denies LOC. States she has \"breast pain\" due to her \"elbow hitting my breast when I fell.\"     Shortness of Breath     Since fall. Worsening of pain with inspiration.      LIMITATION TO HISTORY   Select: None    HPI  Yang Galeas is a 72 y.o. female who presents to the ED for a ground level fall onset 2 days ago. The patient reports that she fell onto her right side, while pushing a shopping cart. She notes symptoms of breast pain, right shoulder pain, right arm pain, and chest pain. The patient reports she took ibuprofen with little alleviation. She denies symptoms of head trauma or abdominal pain. No exacerbating factors noted.     OUTSIDE HISTORIAN(S):  Select: None        REVIEW OF SYSTEMS  Pertinent positives include ground level fall, breast pain, right shoulder pain, right arm pain, and chest pain. Pertinent negatives include no head trauma or abdominal pain.  All other systems reviewed and negative.     PAST MEDICAL HISTORY  Past Medical History:   Diagnosis Date    Anxiety     Asthma      SURGICAL HISTORY  Past Surgical History:   Procedure Laterality Date    GYN SURGERY       FAMILY HISTORY  None noted.     SOCIAL HISTORY   reports that she has never smoked. She has never used smokeless tobacco. She reports that she does not currently use alcohol. She reports that she does not currently use drugs after having used the following drugs: Inhaled.    CURRENT " "MEDICATIONS  Discharge Medication List as of 12/29/2022  3:26 PM        CONTINUE these medications which have NOT CHANGED    Details   Thiamine HCl (VITAMIN B-1 PO) Take 1 Tablet by mouth every morning., Historical Med      Cyanocobalamin (VITAMIN B-12 PO) Take 1 Tablet by mouth every morning., Historical Med      Ascorbic Acid (VITAMIN C PO) Take 1 Tablet by mouth every morning., Historical Med      ARIPiprazole (ABILIFY) 10 MG Tab Take 1 Tablet by mouth every evening., Disp-30 Tablet, R-0, Normal      FLUoxetine (PROZAC) 10 MG Cap Take 1 Capsule by mouth every morning., Disp-30 Capsule, R-0, Normal      traZODone (DESYREL) 50 MG Tab Take 1 Tablet by mouth at bedtime., Disp-30 Tablet, R-0, Normal      ibuprofen (MOTRIN) 800 MG Tab Take 1 Tablet by mouth in the morning, at noon, and at bedtime., Disp-30 Tablet, R-0, Normal           ALLERGIES  Patient has no known allergies.    PHYSICAL EXAM  /75   Pulse 73   Temp 36.4 °C (97.6 °F) (Temporal)   Resp 18   Ht 1.575 m (5' 2\")   Wt 74 kg (163 lb 2.3 oz)   SpO2 97%   BMI 29.84 kg/m²     Constitutional: Alert in no apparent distress.  HENT: No signs of trauma, Bilateral external ears normal, Nose normal.   Eyes: Pupils are equal and reactive, Conjunctiva normal, Non-icteric.   Neck: Normal range of motion, No tenderness, Supple, No stridor.   Lymphatic: No lymphadenopathy noted.   Cardiovascular: Regular rate and rhythm, no murmurs.   Thorax & Lungs: Right sided chest wall tenderness to palpation. Normal breath sounds, No respiratory distress, No wheezing.  Abdomen: Bowel sounds normal, Soft, No tenderness, No peritoneal signs, No masses, No pulsatile masses.   Skin: Warm, Dry, No erythema, No rash.   Back: C-spine tenderness to palpation.   Extremities: Intact distal pulses, No edema, No tenderness, No cyanosis.  Musculoskeletal: Good range of motion in all major joints. No tenderness to palpation or major deformities noted.   Neurologic: Cranial nerves II " through XII intact.  5 out of 5 strength x4.  Sensation intact light touch.  Normal finger-nose-finger.  Normal reflexes bilaterally.  No clonus. EOMI. PERRL.    Psychiatric: Affect normal, Judgment normal, Mood normal.       DIAGNOSTIC STUDIES    Labs:   Results for orders placed or performed during the hospital encounter of 12/29/22   CBC with Differential   Result Value Ref Range    WBC 8.8 4.8 - 10.8 K/uL    RBC 4.33 4.20 - 5.40 M/uL    Hemoglobin 12.7 12.0 - 16.0 g/dL    Hematocrit 38.8 37.0 - 47.0 %    MCV 89.6 81.4 - 97.8 fL    MCH 29.3 27.0 - 33.0 pg    MCHC 32.7 (L) 33.6 - 35.0 g/dL    RDW 42.8 35.9 - 50.0 fL    Platelet Count 197 164 - 446 K/uL    MPV 10.8 9.0 - 12.9 fL    Neutrophils-Polys 50.30 44.00 - 72.00 %    Lymphocytes 34.70 22.00 - 41.00 %    Monocytes 10.00 0.00 - 13.40 %    Eosinophils 4.20 0.00 - 6.90 %    Basophils 0.60 0.00 - 1.80 %    Immature Granulocytes 0.20 0.00 - 0.90 %    Nucleated RBC 0.00 /100 WBC    Neutrophils (Absolute) 4.45 2.00 - 7.15 K/uL    Lymphs (Absolute) 3.07 1.00 - 4.80 K/uL    Monos (Absolute) 0.88 (H) 0.00 - 0.85 K/uL    Eos (Absolute) 0.37 0.00 - 0.51 K/uL    Baso (Absolute) 0.05 0.00 - 0.12 K/uL    Immature Granulocytes (abs) 0.02 0.00 - 0.11 K/uL    NRBC (Absolute) 0.00 K/uL   Comp Metabolic Panel   Result Value Ref Range    Sodium 140 135 - 145 mmol/L    Potassium 3.8 3.6 - 5.5 mmol/L    Chloride 109 96 - 112 mmol/L    Co2 23 20 - 33 mmol/L    Anion Gap 8.0 7.0 - 16.0    Glucose 90 65 - 99 mg/dL    Bun 21 8 - 22 mg/dL    Creatinine 0.77 0.50 - 1.40 mg/dL    Calcium 9.0 8.5 - 10.5 mg/dL    AST(SGOT) 18 12 - 45 U/L    ALT(SGPT) 12 2 - 50 U/L    Alkaline Phosphatase 151 (H) 30 - 99 U/L    Total Bilirubin 0.3 0.1 - 1.5 mg/dL    Albumin 4.0 3.2 - 4.9 g/dL    Total Protein 7.0 6.0 - 8.2 g/dL    Globulin 3.0 1.9 - 3.5 g/dL    A-G Ratio 1.3 g/dL   CORRECTED CALCIUM   Result Value Ref Range    Correct Calcium 9.0 8.5 - 10.5 mg/dL   ESTIMATED GFR   Result Value Ref Range     GFR (CKD-EPI) 81 >60 mL/min/1.73 m 2   EKG   Result Value Ref Range    Report       [facility] Emergency Dept.    Test Date:  2022  Pt Name:    PABLO RILEY              Department: ER  MRN:        [id number]                      Room:  Gender:     Female                       Technician: [id number]  :        [id number]                   Requested By:ER TRIAGE PROTOCOL  Order #:    [id number]                    Reading MD:    Measurements  Intervals                                Axis  Rate:       69                           P:          25  IL:         187                          QRS:        9  QRSD:       84                           T:          31  QT:         407  QTc:        436    Interpretive Statements  Sinus rhythm  Abnormal R-wave progression, early transition  Compared to ECG 2022 17:31:51  Possible ischemia no longer present       All labs reviewed by me.    Radiology:   The attending Emergency Physician has independently interpreted the diagnostic imaging associated with this visit and is awaiting the final reading from the radiologist, which will be displayed below. Select: X-ray(s) DX-Chest x2, CT-Head w/o, CT-Cspine w/o.     CT-CSPINE WITHOUT PLUS RECONS   Final Result      1.  Degenerative changes of the cervical spine without acute fracture or malalignment.   2.  Degenerative changes of the right temporomandibular joint.      CT-HEAD W/O   Final Result      1.  No evidence of acute territorial infarct, intracranial hemorrhage or mass lesion.   2.  Mild diffuse cerebral substance loss.   3.  Mild microangiopathic ischemic change versus demyelination or gliosis.         DX-CHEST-LIMITED (1 VIEW)   Final Result      No pleural effusion or pneumothorax.      Mild interstitial prominence.      DX-SHOULDER 2+ LEFT   Final Result      1.  No acute fracture or dislocation.   2.  Moderately advanced glenohumeral degenerative changes.   3.  Nonspecific mild interstitial  prominence within the right lung.      DX-CHEST-PORTABLE (1 VIEW)   Final Result      No acute cardiopulmonary abnormality.         COURSE & MEDICAL DECISION MAKING    Nursing notes, vital signs, [id number] reviewed in chart.    Differential diagnoses include but are not limited to:   Given head strike and age patient is [place] head CT rule positive and C-spine rule positive therefore imaging obtained with no obvious fracture or ICH  X-ray of left shoulder unremarkable for fracture along with x-ray of chest    PLAN AND DISPOSITION   1:28 PM - Patient seen and evaluated at bedside. Yang Galeas is a 72 y.o. female who presents for a ground level fall. Patient will be treated with acetaminophen tablet 1,000 mg for her symptoms. Ordered DX-Chest x2, CT-Head w/o, CT-Cspine w/o, CMP, and CBC w/ diff. to evaluate. She understands and agrees to the plan of care.     3:25 PM - I discussed the patient's diagnostic study results which show no fractures or abnormal findings. I discussed plan for discharge and follow up as outlined below. The patient is stable for discharge at this time and will return for any new or worsening symptoms. Patient verbalizes understanding and support with my plan for discharge.     I verified that the patient was wearing a mask and I was wearing appropriate PPE every time I entered the room. The patient's mask was on the patient at all times during my encounter except for a brief view of the oropharynx.    The patient will return for new or worsening symptoms and is stable at the time of discharge.    The patient is referred to a primary physician for diabetic screening and for all other preventative health concerns.    DISPOSITION:  Patient will be discharged home in stable condition.    FOLLOW UP:  [facility], Emergency Dept  [address] [address] [phone]    If symptoms worsen    [facility]  [address] [facility]  [address]  [phone]  In 3 days      OUTPATIENT MEDICATIONS:  Discharge Medication List as of 12/29/2022  3:26 PM        START taking these medications    Details   acetaminophen (TYLENOL) 500 MG Tab Take 1-2 Tablets by mouth every 6 hours as needed for Mild Pain for up to 10 days., Disp-30 Tablet, R-0, Normal           FINAL IMPRESSION   1. Fall, initial encounter    2. Rib pain on right side    3. Closed head injury, initial encounter    4. Strain of neck muscle, initial encounter      I, Radha Mercedes (Scribe), am scribing for, and in the presence of, Justin Sumner M.D..    Electronically signed by: Radha Mercedes (Gavinibandi), 12/29/2022    The note accurately reflects work and decisions made by me.  Justin Sumner M.D.  12/29/2022  9:29 PM

## 2022-12-29 NOTE — DISCHARGE INSTRUCTIONS
Please discuss the following findings with your regular doctor:  CT-CSPINE WITHOUT PLUS RECONS   Final Result      1.  Degenerative changes of the cervical spine without acute fracture or malalignment.   2.  Degenerative changes of the right temporomandibular joint.      CT-HEAD W/O   Final Result      1.  No evidence of acute territorial infarct, intracranial hemorrhage or mass lesion.   2.  Mild diffuse cerebral substance loss.   3.  Mild microangiopathic ischemic change versus demyelination or gliosis.         DX-CHEST-LIMITED (1 VIEW)   Final Result      No pleural effusion or pneumothorax.      Mild interstitial prominence.      DX-SHOULDER 2+ LEFT   Final Result      1.  No acute fracture or dislocation.   2.  Moderately advanced glenohumeral degenerative changes.   3.  Nonspecific mild interstitial prominence within the right lung.      DX-CHEST-PORTABLE (1 VIEW)   Final Result      No acute cardiopulmonary abnormality.        Labs Reviewed   CBC WITH DIFFERENTIAL - Abnormal; Notable for the following components:       Result Value    MCHC 32.7 (*)     Monos (Absolute) 0.88 (*)     All other components within normal limits   COMP METABOLIC PANEL - Abnormal; Notable for the following components:    Alkaline Phosphatase 151 (*)     All other components within normal limits   CORRECTED CALCIUM   ESTIMATED GFR

## 2022-12-29 NOTE — ED TRIAGE NOTES
"Chief Complaint   Patient presents with    Shoulder Injury     Patient accidentally fell while pushing her shopping cart over a speed bump. Patient states she fell onto her right side and presents with right sided shoulder, back, and hip pain. Endorses hitting head when she fell. Denies Daily blood thinners or daily aspirin use. Denies LOC. States she has \"breast pain\" due to her \"elbow hitting my breast when I fell.\"     Shortness of Breath     Since fall. Worsening of pain with inspiration.        "

## 2023-02-19 ENCOUNTER — HOSPITAL ENCOUNTER (EMERGENCY)
Facility: MEDICAL CENTER | Age: 73
End: 2023-02-19
Attending: EMERGENCY MEDICINE
Payer: MEDICARE

## 2023-02-19 ENCOUNTER — APPOINTMENT (OUTPATIENT)
Dept: RADIOLOGY | Facility: MEDICAL CENTER | Age: 73
End: 2023-02-19
Attending: EMERGENCY MEDICINE
Payer: MEDICARE

## 2023-02-19 VITALS
SYSTOLIC BLOOD PRESSURE: 113 MMHG | RESPIRATION RATE: 17 BRPM | DIASTOLIC BLOOD PRESSURE: 55 MMHG | BODY MASS INDEX: 28.79 KG/M2 | OXYGEN SATURATION: 93 % | WEIGHT: 157.41 LBS | TEMPERATURE: 98 F | HEART RATE: 91 BPM

## 2023-02-19 DIAGNOSIS — J45.901 MILD ASTHMA WITH ACUTE EXACERBATION, UNSPECIFIED WHETHER PERSISTENT: ICD-10-CM

## 2023-02-19 DIAGNOSIS — J22 LOWER RESPIRATORY INFECTION: ICD-10-CM

## 2023-02-19 LAB
ANION GAP SERPL CALC-SCNC: 11 MMOL/L (ref 7–16)
BASOPHILS # BLD AUTO: 0.7 % (ref 0–1.8)
BASOPHILS # BLD: 0.08 K/UL (ref 0–0.12)
BUN SERPL-MCNC: 22 MG/DL (ref 8–22)
CALCIUM SERPL-MCNC: 9.4 MG/DL (ref 8.5–10.5)
CHLORIDE SERPL-SCNC: 102 MMOL/L (ref 96–112)
CO2 SERPL-SCNC: 24 MMOL/L (ref 20–33)
CREAT SERPL-MCNC: 0.78 MG/DL (ref 0.5–1.4)
EOSINOPHIL # BLD AUTO: 0.19 K/UL (ref 0–0.51)
EOSINOPHIL NFR BLD: 1.5 % (ref 0–6.9)
ERYTHROCYTE [DISTWIDTH] IN BLOOD BY AUTOMATED COUNT: 42.3 FL (ref 35.9–50)
FLUAV RNA SPEC QL NAA+PROBE: NEGATIVE
FLUBV RNA SPEC QL NAA+PROBE: NEGATIVE
GFR SERPLBLD CREATININE-BSD FMLA CKD-EPI: 80 ML/MIN/1.73 M 2
GLUCOSE SERPL-MCNC: 110 MG/DL (ref 65–99)
HCT VFR BLD AUTO: 39.7 % (ref 37–47)
HGB BLD-MCNC: 12.9 G/DL (ref 12–16)
IMM GRANULOCYTES # BLD AUTO: 0.12 K/UL (ref 0–0.11)
IMM GRANULOCYTES NFR BLD AUTO: 1 % (ref 0–0.9)
LYMPHOCYTES # BLD AUTO: 3.54 K/UL (ref 1–4.8)
LYMPHOCYTES NFR BLD: 28.9 % (ref 22–41)
MCH RBC QN AUTO: 29.3 PG (ref 27–33)
MCHC RBC AUTO-ENTMCNC: 32.5 G/DL (ref 33.6–35)
MCV RBC AUTO: 90.2 FL (ref 81.4–97.8)
MONOCYTES # BLD AUTO: 1 K/UL (ref 0–0.85)
MONOCYTES NFR BLD AUTO: 8.2 % (ref 0–13.4)
NEUTROPHILS # BLD AUTO: 7.33 K/UL (ref 2–7.15)
NEUTROPHILS NFR BLD: 59.7 % (ref 44–72)
NRBC # BLD AUTO: 0 K/UL
NRBC BLD-RTO: 0 /100 WBC
PLATELET # BLD AUTO: 286 K/UL (ref 164–446)
PMV BLD AUTO: 10.3 FL (ref 9–12.9)
POTASSIUM SERPL-SCNC: 3.5 MMOL/L (ref 3.6–5.5)
RBC # BLD AUTO: 4.4 M/UL (ref 4.2–5.4)
RSV RNA SPEC QL NAA+PROBE: NEGATIVE
SARS-COV-2 RNA RESP QL NAA+PROBE: NOTDETECTED
SODIUM SERPL-SCNC: 137 MMOL/L (ref 135–145)
SPECIMEN SOURCE: NORMAL
WBC # BLD AUTO: 12.3 K/UL (ref 4.8–10.8)

## 2023-02-19 PROCEDURE — C9803 HOPD COVID-19 SPEC COLLECT: HCPCS | Performed by: EMERGENCY MEDICINE

## 2023-02-19 PROCEDURE — 700101 HCHG RX REV CODE 250: Performed by: EMERGENCY MEDICINE

## 2023-02-19 PROCEDURE — 700102 HCHG RX REV CODE 250 W/ 637 OVERRIDE(OP): Performed by: EMERGENCY MEDICINE

## 2023-02-19 PROCEDURE — 99284 EMERGENCY DEPT VISIT MOD MDM: CPT

## 2023-02-19 PROCEDURE — 700111 HCHG RX REV CODE 636 W/ 250 OVERRIDE (IP): Performed by: EMERGENCY MEDICINE

## 2023-02-19 PROCEDURE — 700105 HCHG RX REV CODE 258: Performed by: EMERGENCY MEDICINE

## 2023-02-19 PROCEDURE — 36415 COLL VENOUS BLD VENIPUNCTURE: CPT

## 2023-02-19 PROCEDURE — 85025 COMPLETE CBC W/AUTO DIFF WBC: CPT

## 2023-02-19 PROCEDURE — 80048 BASIC METABOLIC PNL TOTAL CA: CPT

## 2023-02-19 PROCEDURE — 71045 X-RAY EXAM CHEST 1 VIEW: CPT

## 2023-02-19 PROCEDURE — 0241U HCHG SARS-COV-2 COVID-19 NFCT DS RESP RNA 4 TRGT MIC: CPT

## 2023-02-19 PROCEDURE — 94640 AIRWAY INHALATION TREATMENT: CPT

## 2023-02-19 PROCEDURE — A9270 NON-COVERED ITEM OR SERVICE: HCPCS | Performed by: EMERGENCY MEDICINE

## 2023-02-19 RX ORDER — DOXYCYCLINE 100 MG/1
100 CAPSULE ORAL 2 TIMES DAILY
Qty: 20 CAPSULE | Refills: 0 | Status: ACTIVE | OUTPATIENT
Start: 2023-02-19 | End: 2023-03-01

## 2023-02-19 RX ORDER — IPRATROPIUM BROMIDE AND ALBUTEROL SULFATE 2.5; .5 MG/3ML; MG/3ML
3 SOLUTION RESPIRATORY (INHALATION) ONCE
Status: COMPLETED | OUTPATIENT
Start: 2023-02-19 | End: 2023-02-19

## 2023-02-19 RX ORDER — SODIUM CHLORIDE 9 MG/ML
1000 INJECTION, SOLUTION INTRAVENOUS ONCE
Status: COMPLETED | OUTPATIENT
Start: 2023-02-19 | End: 2023-02-19

## 2023-02-19 RX ORDER — PREDNISONE 20 MG/1
40 TABLET ORAL DAILY
Qty: 8 TABLET | Refills: 0 | Status: SHIPPED | OUTPATIENT
Start: 2023-02-20 | End: 2023-02-24

## 2023-02-19 RX ORDER — ALBUTEROL SULFATE 90 UG/1
2 AEROSOL, METERED RESPIRATORY (INHALATION) EVERY 6 HOURS PRN
Qty: 8.5 G | Refills: 0 | Status: SHIPPED | OUTPATIENT
Start: 2023-02-19

## 2023-02-19 RX ORDER — BENZONATATE 100 MG/1
100 CAPSULE ORAL 3 TIMES DAILY PRN
Qty: 21 CAPSULE | Refills: 0 | Status: SHIPPED | OUTPATIENT
Start: 2023-02-19 | End: 2023-02-26

## 2023-02-19 RX ORDER — PREDNISONE 20 MG/1
60 TABLET ORAL ONCE
Status: COMPLETED | OUTPATIENT
Start: 2023-02-19 | End: 2023-02-19

## 2023-02-19 RX ORDER — DOXYCYCLINE 100 MG/1
100 TABLET ORAL ONCE
Status: COMPLETED | OUTPATIENT
Start: 2023-02-19 | End: 2023-02-19

## 2023-02-19 RX ADMIN — DOXYCYCLINE 100 MG: 100 TABLET, FILM COATED ORAL at 22:09

## 2023-02-19 RX ADMIN — IPRATROPIUM BROMIDE AND ALBUTEROL SULFATE 3 ML: 2.5; .5 SOLUTION RESPIRATORY (INHALATION) at 19:22

## 2023-02-19 RX ADMIN — PREDNISONE 60 MG: 20 TABLET ORAL at 20:32

## 2023-02-19 RX ADMIN — SODIUM CHLORIDE 1000 ML: 9 INJECTION, SOLUTION INTRAVENOUS at 20:31

## 2023-02-19 ASSESSMENT — FIBROSIS 4 INDEX: FIB4 SCORE: 1.95

## 2023-02-19 ASSESSMENT — LIFESTYLE VARIABLES: DO YOU DRINK ALCOHOL: NO

## 2023-02-20 NOTE — ED NOTES
Provided pt with warm blanket upon request and repositioned for comfort. No further needs at this time.

## 2023-02-20 NOTE — ED NOTES
Patient provided discharge instructions. Patient verbalized understanding. Patient leaving ER in stable condition. Patient ambulatory with steady gait. IV removed.

## 2023-02-20 NOTE — ED PROVIDER NOTES
"  ER Provider Note    Scribed for Shawnee Fisher M.d. by Santy Duncan. 2/19/2023  6:13 PM    Primary Care Provider: Pcp Pt States None    CHIEF COMPLAINT  Chief Complaint   Patient presents with    Cough    Asthma     EXTERNAL RECORDS REVIEWED  Other patient has had multiple ER visits to the [facility] ER over the last year for various complaints.  Patient is currently homeless.    HPI/ROS  LIMITATION TO HISTORY   Select: : None  OUTSIDE HISTORIAN(S):  None.    Yang Galeas is a 73 y.o. female who presents to the ED complaining of a cough onset two weeks ago. Patient reports that she was initially sick at the beginning of February and was improved, but another woman at the shelter came in sick recently and \"re-infected her\". She is experiencing associated cough productive of green phlegm, headache, sore throat, few episodes of post-tussive emesis, diarrhea, and rhinorrhea. Patient describes her phlegm as light green, but states that it has recently began to turn a darker shade of green. She denies any fever, shortness of breath, chest pain, chills, or abdominal pain. Patient reports that she experienced a fall last month and is still experiencing some pains from that injury. Yang has a history of asthma, but denies any other medical problems. Patient did receive her flu shot this year.     PAST MEDICAL HISTORY  Past Medical History:   Diagnosis Date    Anxiety     Asthma        SURGICAL HISTORY  Past Surgical History:   Procedure Laterality Date    GYN SURGERY         FAMILY HISTORY  None noted.    SOCIAL HISTORY   reports that she has never smoked. She has never used smokeless tobacco. She reports that she does not currently use alcohol. She reports that she does not currently use drugs after having used the following drugs: Inhaled.    CURRENT MEDICATIONS  Previous Medications    ARIPIPRAZOLE (ABILIFY) 10 MG TAB    Take 1 Tablet by mouth every evening.    ASCORBIC ACID (VITAMIN C PO)    Take 1 Tablet by mouth " every morning.    CYANOCOBALAMIN (VITAMIN B-12 PO)    Take 1 Tablet by mouth every morning.    FLUOXETINE (PROZAC) 10 MG CAP    Take 1 Capsule by mouth every morning.    IBUPROFEN (MOTRIN) 800 MG TAB    Take 1 Tablet by mouth in the morning, at noon, and at bedtime.    THIAMINE HCL (VITAMIN B-1 PO)    Take 1 Tablet by mouth every morning.    TRAZODONE (DESYREL) 50 MG TAB    Take 1 Tablet by mouth at bedtime.       ALLERGIES  Patient has no known allergies.    PHYSICAL EXAM  BP (!) 145/89   Pulse (!) 110   Temp 36.3 °C (97.4 °F) (Temporal)   Resp 20   Wt 71.4 kg (157 lb 6.5 oz)   SpO2 100%   BMI 28.79 kg/m²   Constitutional: Well developed, well nourished; No acute distress; Non-toxic appearance.  Frequently coughing during the exam.  Cough sounds harsh and bronchospastic.  HENT: Normocephalic, atraumatic; Bilateral external ears normal; Oropharynx with moist mucous membranes; No erythema or exudates in the posterior oropharynx. Patient is edentulous.   Eyes: PERRL, EOMI, Conjunctiva normal. No discharge.   Neck:  Supple, nontender midline; No stridor; No nuchal rigidity.   Lymphatic: No cervical lymphadenopathy noted.   Cardiovascular: Regular rate and rhythm without murmurs, rubs, or gallop.   Thorax & Lungs: Patient is coughing frequently throughout the physical exam, Few expiratory wheezes that are cleared with coughing, no  rales or rhonchi. Nontender chest wall. No crepitus or subcutaneous air  Abdomen: Soft, nontender, bowel sounds normal. No obvious masses; No pulsatile masses; no rebound, guarding, or peritoneal signs.   Skin: Good color; warm and dry without rash or petechia.  Back: Nontender, No CVA tenderness.   Extremities: Distal radial, dorsalis pedis, posterior tibial pulses are equal bilaterally; No edema; Nontender calves or saphenous, No cyanosis, No clubbing.   Musculoskeletal: Good range of motion in all major joints. No tenderness to palpation or major deformities noted.   Neurologic:  Alert & oriented x 4, clear speech    DIAGNOSTIC STUDIES    Labs:   Results for orders placed or performed during the hospital encounter of 02/19/23   CBC WITH DIFFERENTIAL   Result Value Ref Range    WBC 12.3 (H) 4.8 - 10.8 K/uL    RBC 4.40 4.20 - 5.40 M/uL    Hemoglobin 12.9 12.0 - 16.0 g/dL    Hematocrit 39.7 37.0 - 47.0 %    MCV 90.2 81.4 - 97.8 fL    MCH 29.3 27.0 - 33.0 pg    MCHC 32.5 (L) 33.6 - 35.0 g/dL    RDW 42.3 35.9 - 50.0 fL    Platelet Count 286 164 - 446 K/uL    MPV 10.3 9.0 - 12.9 fL    Neutrophils-Polys 59.70 44.00 - 72.00 %    Lymphocytes 28.90 22.00 - 41.00 %    Monocytes 8.20 0.00 - 13.40 %    Eosinophils 1.50 0.00 - 6.90 %    Basophils 0.70 0.00 - 1.80 %    Immature Granulocytes 1.00 (H) 0.00 - 0.90 %    Nucleated RBC 0.00 /100 WBC    Neutrophils (Absolute) 7.33 (H) 2.00 - 7.15 K/uL    Lymphs (Absolute) 3.54 1.00 - 4.80 K/uL    Monos (Absolute) 1.00 (H) 0.00 - 0.85 K/uL    Eos (Absolute) 0.19 0.00 - 0.51 K/uL    Baso (Absolute) 0.08 0.00 - 0.12 K/uL    Immature Granulocytes (abs) 0.12 (H) 0.00 - 0.11 K/uL    NRBC (Absolute) 0.00 K/uL   BASIC METABOLIC PANEL   Result Value Ref Range    Sodium 137 135 - 145 mmol/L    Potassium 3.5 (L) 3.6 - 5.5 mmol/L    Chloride 102 96 - 112 mmol/L    Co2 24 20 - 33 mmol/L    Glucose 110 (H) 65 - 99 mg/dL    Bun 22 8 - 22 mg/dL    Creatinine 0.78 0.50 - 1.40 mg/dL    Calcium 9.4 8.5 - 10.5 mg/dL    Anion Gap 11.0 7.0 - 16.0   CoV-2, FLU A/B, and RSV by PCR (2-4 Hours CEPHEID) : Collect NP swab in VTM    Specimen: Respirate   Result Value Ref Range    Influenza virus A RNA Negative Negative    Influenza virus B, PCR Negative Negative    RSV, PCR Negative Negative    SARS-CoV-2 by PCR NotDetected     SARS-CoV-2 Source NP Swab    ESTIMATED GFR   Result Value Ref Range    GFR (CKD-EPI) 80 >60 mL/min/1.73 m 2         Radiology:   The attending emergency physician has independently interpreted the diagnostic imaging associated with this visit and am waiting the final  reading from the radiologist.     DX-CHEST-PORTABLE (1 VIEW)   Final Result      No evidence of acute cardiopulmonary process.         Preliminary interpretation is a follows: I reviewed the patient's chest x-ray.  No obvious pneumonia.    Radiologist interpretation: Noted above    COURSE & MEDICAL DECISION MAKING     ED Observation Status? No; Patient does not meet criteria for ED Observation.     6:35 PM - Patient seen and examined at bedside. Discussed plan of care, including imaging patient's chest. Patient agrees to the plan of care. The patient will be medicated with Deltasone 60 mg and Duoneb. Ordered for BMP, CBC with differential, CoV-2, FLU A/B, and RSV by PCR, and DX-Chest to evaluate her symptoms.      INITIAL ASSESSMENT, COURSE AND PLAN  Care Narrative: Patient presents to the ER complaining of cough, runny nose, sore throat, headache and wheezing.  Patient said she initially developed an upper respiratory infection back in early February.  She says she felt like she was getting a lot better, but then another woman came into the shelter recently coughing and as soon as she was exposed to that woman, she started getting sick again.  Patient is coughing quite a bit here in the ER.  Her cough is bronchospastic.  She has some expiratory wheezes on examination that did clear with cough.  She is not in any respiratory distress.  However, she has asthma and says she does feel like her asthma is mildly exacerbated with this upper respiratory infection.  No fevers.  She does have sore throat, runny nose and headache.  She is negative for COVID, flu and RSV.  Chest x-ray is negative.  No obvious pneumonia.  She is not hypoxic.  She came in initially a little tachycardic, but was given some IV fluids and heart rate normalized.  I suspected she might of been a bit dehydrated with all of the coughing that she was doing.  Lab work was obtained and is fairly unremarkable.  White count is minimally elevated at 12.3.   "No significant left shift.  She is feeling much better with the DuoNeb and steroids I gave her here in the ER.  She is resting lying comfortably on the gurney upon reevaluation.  At this time I think she is safe and stable for outpatient management discharge back to the shelter.  She is not hypoxic.  She is feeling much better.  She will go home with albuterol inhaler as well as a few more days of steroids.  I also sent her with some Tessalon and some doxycycline.  She has been given very strict return precautions and discharge instructions and she understands if she gets worse in any way or develops any more wheezing or trouble breathing she must return to ER immediately.  Patient understands treatment plan and follow-up.    2205: Patient was reevaluated.  She says she is feeling \"much much better.\"  She says she is coughing much less with the treatment and the steroids.  She is resting comfortably.  Vital signs are normal and stable.  She is not hypoxic.  She says she feels fine to go back to the shelter.    HYDRATION: Based on the patient's presentation of Tachycardia the patient was given IV fluids. IV Hydration was used because oral hydration was not as rapid as required. Upon recheck following hydration, the patient was improved.  ADDITIONAL PROBLEM LIST    Problem #1: Cough   Problem #2: Asthma exacerbation  Problem #3: Runny nose, sore throat and headache        DISPOSITION AND DISCUSSIONS  I have discussed management of the patient with the following physicians and MC's:  none    Discussion of management with other [facility] or appropriate source(s): None     Escalation of care considered, and ultimately not performed: diagnostic imaging.  I considered CT scanning, but at this time I do not think it is necessary.  Chest x-ray is normal.  Patient feels much better with the DuoNeb and steroids.  I think this is a upper respiratory infection with a mild asthma exacerbation.  Very low suspicion for PE.    Barriers " to care at this time, including but not limited to: Patient is homeless.     Decision tools and prescription drugs considered including, but not limited to: Antivirals no antivirals needed at this time.  Patient is negative for COVID, flu and RSV. .    FINAL DIANGOSIS  1. Lower respiratory infection Acute   2. Mild asthma with acute exacerbation, unspecified whether persistent Acute          The note accurately reflects work and decisions made by me.  Shawnee Fisher M.D.  2/19/2023  10:06 PM     Santy OATES ([place]), am scribing for, and in the presence of, Shawnee Fisher M.D..    Electronically signed by: Santy Duncan ([place]), 2/19/2023    Shawnee OATES M.D. personally performed the services described in this documentation, as scribed by Santy Duncan in my presence, and it is both accurate and complete.

## 2023-02-20 NOTE — DISCHARGE INSTRUCTIONS
Follow-up at the [facility] clinic within the next 1 to 2 days.  Please call for appointment.    Return to the ER for any worsening cough, coughing of rust colored phlegm or blood, fevers over 100.4, shaking chills, vomiting, worsening sore throat or runny nose, worsening headache, stiff neck, chest pain, shortness of breath, worsening wheezing, or for any concerns.    Drink plenty of fluids to stay well-hydrated.

## 2023-02-27 ENCOUNTER — HOSPITAL ENCOUNTER (EMERGENCY)
Facility: MEDICAL CENTER | Age: 73
End: 2023-02-27
Attending: EMERGENCY MEDICINE
Payer: MEDICARE

## 2023-02-27 ENCOUNTER — APPOINTMENT (OUTPATIENT)
Dept: RADIOLOGY | Facility: MEDICAL CENTER | Age: 73
End: 2023-02-27
Attending: EMERGENCY MEDICINE
Payer: MEDICARE

## 2023-02-27 VITALS
SYSTOLIC BLOOD PRESSURE: 107 MMHG | HEIGHT: 62 IN | TEMPERATURE: 98.1 F | WEIGHT: 160 LBS | OXYGEN SATURATION: 95 % | HEART RATE: 68 BPM | DIASTOLIC BLOOD PRESSURE: 64 MMHG | BODY MASS INDEX: 29.44 KG/M2 | RESPIRATION RATE: 20 BRPM

## 2023-02-27 DIAGNOSIS — N20.0 KIDNEY STONE: ICD-10-CM

## 2023-02-27 LAB
ALBUMIN SERPL BCP-MCNC: 3.7 G/DL (ref 3.2–4.9)
ALBUMIN/GLOB SERPL: 1.1 G/DL
ALP SERPL-CCNC: 105 U/L (ref 30–99)
ALT SERPL-CCNC: 22 U/L (ref 2–50)
ANION GAP SERPL CALC-SCNC: 13 MMOL/L (ref 7–16)
APPEARANCE UR: CLEAR
AST SERPL-CCNC: 16 U/L (ref 12–45)
BACTERIA #/AREA URNS HPF: NEGATIVE /HPF
BASOPHILS # BLD AUTO: 0.4 % (ref 0–1.8)
BASOPHILS # BLD: 0.06 K/UL (ref 0–0.12)
BILIRUB SERPL-MCNC: 0.6 MG/DL (ref 0.1–1.5)
BILIRUB UR QL STRIP.AUTO: NEGATIVE
BUN SERPL-MCNC: 30 MG/DL (ref 8–22)
CALCIUM ALBUM COR SERPL-MCNC: 9.6 MG/DL (ref 8.5–10.5)
CALCIUM SERPL-MCNC: 9.4 MG/DL (ref 8.5–10.5)
CHLORIDE SERPL-SCNC: 107 MMOL/L (ref 96–112)
CO2 SERPL-SCNC: 21 MMOL/L (ref 20–33)
COLOR UR: YELLOW
CREAT SERPL-MCNC: 1.1 MG/DL (ref 0.5–1.4)
EKG IMPRESSION: NORMAL
EKG IMPRESSION: NORMAL
EOSINOPHIL # BLD AUTO: 0.22 K/UL (ref 0–0.51)
EOSINOPHIL NFR BLD: 1.5 % (ref 0–6.9)
EPI CELLS #/AREA URNS HPF: NEGATIVE /HPF
ERYTHROCYTE [DISTWIDTH] IN BLOOD BY AUTOMATED COUNT: 43 FL (ref 35.9–50)
GFR SERPLBLD CREATININE-BSD FMLA CKD-EPI: 53 ML/MIN/1.73 M 2
GLOBULIN SER CALC-MCNC: 3.3 G/DL (ref 1.9–3.5)
GLUCOSE SERPL-MCNC: 168 MG/DL (ref 65–99)
GLUCOSE UR STRIP.AUTO-MCNC: NEGATIVE MG/DL
HCT VFR BLD AUTO: 39.1 % (ref 37–47)
HGB BLD-MCNC: 12.6 G/DL (ref 12–16)
HYALINE CASTS #/AREA URNS LPF: ABNORMAL /LPF
IMM GRANULOCYTES # BLD AUTO: 0.15 K/UL (ref 0–0.11)
IMM GRANULOCYTES NFR BLD AUTO: 1 % (ref 0–0.9)
KETONES UR STRIP.AUTO-MCNC: NEGATIVE MG/DL
LEUKOCYTE ESTERASE UR QL STRIP.AUTO: NEGATIVE
LIPASE SERPL-CCNC: 26 U/L (ref 11–82)
LYMPHOCYTES # BLD AUTO: 2.38 K/UL (ref 1–4.8)
LYMPHOCYTES NFR BLD: 15.9 % (ref 22–41)
MCH RBC QN AUTO: 29 PG (ref 27–33)
MCHC RBC AUTO-ENTMCNC: 32.2 G/DL (ref 33.6–35)
MCV RBC AUTO: 90.1 FL (ref 81.4–97.8)
MICRO URNS: ABNORMAL
MONOCYTES # BLD AUTO: 0.82 K/UL (ref 0–0.85)
MONOCYTES NFR BLD AUTO: 5.5 % (ref 0–13.4)
NEUTROPHILS # BLD AUTO: 11.3 K/UL (ref 2–7.15)
NEUTROPHILS NFR BLD: 75.7 % (ref 44–72)
NITRITE UR QL STRIP.AUTO: NEGATIVE
NRBC # BLD AUTO: 0 K/UL
NRBC BLD-RTO: 0 /100 WBC
PH UR STRIP.AUTO: 6 [PH] (ref 5–8)
PLATELET # BLD AUTO: 351 K/UL (ref 164–446)
PMV BLD AUTO: 9.8 FL (ref 9–12.9)
POTASSIUM SERPL-SCNC: 3.7 MMOL/L (ref 3.6–5.5)
PROT SERPL-MCNC: 7 G/DL (ref 6–8.2)
PROT UR QL STRIP: NEGATIVE MG/DL
RBC # BLD AUTO: 4.34 M/UL (ref 4.2–5.4)
RBC # URNS HPF: ABNORMAL /HPF
RBC UR QL AUTO: ABNORMAL
SODIUM SERPL-SCNC: 141 MMOL/L (ref 135–145)
SP GR UR STRIP.AUTO: 1.03
TROPONIN T SERPL-MCNC: 11 NG/L (ref 6–19)
UROBILINOGEN UR STRIP.AUTO-MCNC: 0.2 MG/DL
WBC # BLD AUTO: 14.9 K/UL (ref 4.8–10.8)
WBC #/AREA URNS HPF: ABNORMAL /HPF

## 2023-02-27 PROCEDURE — 700111 HCHG RX REV CODE 636 W/ 250 OVERRIDE (IP): Mod: UD | Performed by: EMERGENCY MEDICINE

## 2023-02-27 PROCEDURE — 36415 COLL VENOUS BLD VENIPUNCTURE: CPT

## 2023-02-27 PROCEDURE — 81001 URINALYSIS AUTO W/SCOPE: CPT

## 2023-02-27 PROCEDURE — 74177 CT ABD & PELVIS W/CONTRAST: CPT

## 2023-02-27 PROCEDURE — 700105 HCHG RX REV CODE 258: Performed by: EMERGENCY MEDICINE

## 2023-02-27 PROCEDURE — 80053 COMPREHEN METABOLIC PANEL: CPT

## 2023-02-27 PROCEDURE — 700117 HCHG RX CONTRAST REV CODE 255: Mod: UD | Performed by: EMERGENCY MEDICINE

## 2023-02-27 PROCEDURE — 99285 EMERGENCY DEPT VISIT HI MDM: CPT

## 2023-02-27 PROCEDURE — 85025 COMPLETE CBC W/AUTO DIFF WBC: CPT

## 2023-02-27 PROCEDURE — 84484 ASSAY OF TROPONIN QUANT: CPT

## 2023-02-27 PROCEDURE — 96375 TX/PRO/DX INJ NEW DRUG ADDON: CPT

## 2023-02-27 PROCEDURE — 96374 THER/PROPH/DIAG INJ IV PUSH: CPT | Mod: XU

## 2023-02-27 PROCEDURE — 93005 ELECTROCARDIOGRAM TRACING: CPT | Performed by: EMERGENCY MEDICINE

## 2023-02-27 PROCEDURE — 83690 ASSAY OF LIPASE: CPT

## 2023-02-27 RX ORDER — MORPHINE SULFATE 4 MG/ML
2 INJECTION INTRAVENOUS ONCE
Status: COMPLETED | OUTPATIENT
Start: 2023-02-27 | End: 2023-02-27

## 2023-02-27 RX ORDER — HYDROCODONE BITARTRATE AND ACETAMINOPHEN 5; 325 MG/1; MG/1
1 TABLET ORAL EVERY 6 HOURS PRN
Qty: 12 TABLET | Refills: 0 | Status: SHIPPED | OUTPATIENT
Start: 2023-02-27 | End: 2023-03-01

## 2023-02-27 RX ORDER — ONDANSETRON 2 MG/ML
4 INJECTION INTRAMUSCULAR; INTRAVENOUS ONCE
Status: COMPLETED | OUTPATIENT
Start: 2023-02-27 | End: 2023-02-27

## 2023-02-27 RX ORDER — KETOROLAC TROMETHAMINE 30 MG/ML
15 INJECTION, SOLUTION INTRAMUSCULAR; INTRAVENOUS ONCE
Status: COMPLETED | OUTPATIENT
Start: 2023-02-27 | End: 2023-02-27

## 2023-02-27 RX ORDER — SODIUM CHLORIDE, SODIUM LACTATE, POTASSIUM CHLORIDE, CALCIUM CHLORIDE 600; 310; 30; 20 MG/100ML; MG/100ML; MG/100ML; MG/100ML
500 INJECTION, SOLUTION INTRAVENOUS ONCE
Status: COMPLETED | OUTPATIENT
Start: 2023-02-27 | End: 2023-02-27

## 2023-02-27 RX ADMIN — IOHEXOL 100 ML: 350 INJECTION, SOLUTION INTRAVENOUS at 09:49

## 2023-02-27 RX ADMIN — SODIUM CHLORIDE, POTASSIUM CHLORIDE, SODIUM LACTATE AND CALCIUM CHLORIDE 500 ML: 600; 310; 30; 20 INJECTION, SOLUTION INTRAVENOUS at 09:15

## 2023-02-27 RX ADMIN — KETOROLAC TROMETHAMINE 15 MG: 30 INJECTION, SOLUTION INTRAMUSCULAR at 11:16

## 2023-02-27 RX ADMIN — ONDANSETRON 4 MG: 2 INJECTION INTRAMUSCULAR; INTRAVENOUS at 09:55

## 2023-02-27 RX ADMIN — MORPHINE SULFATE 2 MG: 4 INJECTION INTRAVENOUS at 09:55

## 2023-02-27 ASSESSMENT — FIBROSIS 4 INDEX: FIB4 SCORE: 1.34

## 2023-02-27 NOTE — ED NOTES
Report received, assuming care.  Patient resting on gurney, eyes closed, resp even and unlabored.  Rouses w/ ease.  Patient informed that we need a urine sample, states unable to go at this time.

## 2023-02-27 NOTE — DISCHARGE PLANNING
SW contacted the patient's BROOKE Farias who set up a ride for the patient to return to [facility]. Medical team was notified.

## 2023-02-27 NOTE — ED PROVIDER NOTES
ED Provider Note    CHIEF COMPLAINT  Chief Complaint   Patient presents with    Abdominal Pain    N/V       EXTERNAL RECORDS REVIEWED  Patient seen in the emergency department a week ago with cough.  WBC count was 12.  Chest x-ray was negative for infiltrate.  Patient diagnosed with asthma exacerbation lower respiratory tract infection.    HPI/ROS    OUTSIDE HISTORIAN(S):  Per EMS vital signs were stable.  They gave her fentanyl and Zofran for significant abdominal pain.    Yang Galeas is a 73 y.o. female who presents with abdominal pain.  Woke up this morning with pain.  Located left lower quadrant.  Severe constant nonradiating.  Felt like she had to have a bowel movement.  Went to the restroom only a few small stools past.  Pain is persistent therefore called paramedics.  Patient was given fentanyl and Zofran prior to arrival.  Reports improvement of symptoms although still 7/10.  She denies fever.  She felt nauseous and did vomit.  No dysuria hematuria frequency.  No chest pain or shortness of breath.  Denies current cough congestion runny nose    PAST MEDICAL HISTORY   has a past medical history of Anxiety and Asthma.    SURGICAL HISTORY   has a past surgical history that includes gyn surgery.    FAMILY HISTORY  No family history on file.    SOCIAL HISTORY  Social History     Tobacco Use    Smoking status: Never    Smokeless tobacco: Never   Vaping Use    Vaping Use: Never used   Substance and Sexual Activity    Alcohol use: Not Currently     Comment: occasional    Drug use: Not Currently     Types: Inhaled     Comment: occasional    Sexual activity: Not on file       CURRENT MEDICATIONS  Home Medications       Reviewed by Keila Kapoor R.N. (Registered Nurse) on 02/27/23 at 0832  Med List Status: Partial     Medication Last Dose Status   albuterol 108 (90 Base) MCG/ACT Aero Soln inhalation aerosol  Active   ARIPiprazole (ABILIFY) 10 MG Tab  Active   Ascorbic Acid (VITAMIN C PO)  Active   Cyanocobalamin  "(VITAMIN B-12 PO)  Active   doxycycline (MONODOX) 100 MG capsule  Active   FLUoxetine (PROZAC) 10 MG Cap  Active   ibuprofen (MOTRIN) 800 MG Tab  Active   Thiamine HCl (VITAMIN B-1 PO)  Active   traZODone (DESYREL) 50 MG Tab  Active                    ALLERGIES  No Known Allergies    PHYSICAL EXAM  VITAL SIGNS: BP (!) 175/87   Pulse 76   Temp 35.9 °C (96.6 °F) (Temporal)   Resp 16   Ht 1.575 m (5' 2\")   Wt 72.6 kg (160 lb)   SpO2 92%   BMI 29.26 kg/m²    Constitutional: Awake and alert  HENT: Normal inspection  Eyes: Normal inspection  Neck: Grossly normal range of motion.  Cardiovascular: Normal heart rate, Normal rhythm.  Symmetric peripheral pulses.   Thorax & Lungs: No respiratory distress, No wheezing, No rales, No rhonchi, No chest tenderness.   Abdomen: Bowel sounds normal, soft, non-distended, nontender, no mass  Skin: No obvious rash.  Back: No tenderness, No CVA tenderness.   Extremities: No clubbing, cyanosis, edema, no Homans or cords.  Neurologic: Grossly normal   Psychiatric: Normal for situation    DIAGNOSTIC STUDIES / PROCEDURES  EKG/LABS  Results for orders placed or performed during the hospital encounter of 02/27/23   CBC WITH DIFFERENTIAL   Result Value Ref Range    WBC 14.9 (H) 4.8 - 10.8 K/uL    RBC 4.34 4.20 - 5.40 M/uL    Hemoglobin 12.6 12.0 - 16.0 g/dL    Hematocrit 39.1 37.0 - 47.0 %    MCV 90.1 81.4 - 97.8 fL    MCH 29.0 27.0 - 33.0 pg    MCHC 32.2 (L) 33.6 - 35.0 g/dL    RDW 43.0 35.9 - 50.0 fL    Platelet Count 351 164 - 446 K/uL    MPV 9.8 9.0 - 12.9 fL    Neutrophils-Polys 75.70 (H) 44.00 - 72.00 %    Lymphocytes 15.90 (L) 22.00 - 41.00 %    Monocytes 5.50 0.00 - 13.40 %    Eosinophils 1.50 0.00 - 6.90 %    Basophils 0.40 0.00 - 1.80 %    Immature Granulocytes 1.00 (H) 0.00 - 0.90 %    Nucleated RBC 0.00 /100 WBC    Neutrophils (Absolute) 11.30 (H) 2.00 - 7.15 K/uL    Lymphs (Absolute) 2.38 1.00 - 4.80 K/uL    Monos (Absolute) 0.82 0.00 - 0.85 K/uL    Eos (Absolute) 0.22 0.00 - " 0.51 K/uL    Baso (Absolute) 0.06 0.00 - 0.12 K/uL    Immature Granulocytes (abs) 0.15 (H) 0.00 - 0.11 K/uL    NRBC (Absolute) 0.00 K/uL   COMP METABOLIC PANEL   Result Value Ref Range    Sodium 141 135 - 145 mmol/L    Potassium 3.7 3.6 - 5.5 mmol/L    Chloride 107 96 - 112 mmol/L    Co2 21 20 - 33 mmol/L    Anion Gap 13.0 7.0 - 16.0    Glucose 168 (H) 65 - 99 mg/dL    Bun 30 (H) 8 - 22 mg/dL    Creatinine 1.10 0.50 - 1.40 mg/dL    Calcium 9.4 8.5 - 10.5 mg/dL    AST(SGOT) 16 12 - 45 U/L    ALT(SGPT) 22 2 - 50 U/L    Alkaline Phosphatase 105 (H) 30 - 99 U/L    Total Bilirubin 0.6 0.1 - 1.5 mg/dL    Albumin 3.7 3.2 - 4.9 g/dL    Total Protein 7.0 6.0 - 8.2 g/dL    Globulin 3.3 1.9 - 3.5 g/dL    A-G Ratio 1.1 g/dL   LIPASE   Result Value Ref Range    Lipase 26 11 - 82 U/L   TROPONIN   Result Value Ref Range    Troponin T 11 6 - 19 ng/L   URINALYSIS CULTURE, IF INDICATED    Specimen: Urine, Clean Catch   Result Value Ref Range    Color Yellow     Character Clear     Specific Gravity 1.031 <1.035    Ph 6.0 5.0 - 8.0    Glucose Negative Negative mg/dL    Ketones Negative Negative mg/dL    Protein Negative Negative mg/dL    Bilirubin Negative Negative    Urobilinogen, Urine 0.2 Negative    Nitrite Negative Negative    Leukocyte Esterase Negative Negative    Occult Blood Trace (A) Negative    Micro Urine Req Microscopic    CORRECTED CALCIUM   Result Value Ref Range    Correct Calcium 9.6 8.5 - 10.5 mg/dL   ESTIMATED GFR   Result Value Ref Range    GFR (CKD-EPI) 53 (A) >60 mL/min/1.73 m 2   URINE MICROSCOPIC (W/UA)   Result Value Ref Range    WBC 0-2 /hpf    RBC 5-10 (A) /hpf    Bacteria Negative None /hpf    Epithelial Cells Negative /hpf    Hyaline Cast 0-2 /lpf   EKG (NOW)   Result Value Ref Range    Report       [facility] Emergency Dept.    Test Date:  2023-02-27  Pt Name:    PABLO RILEY              Department:   MRN:        [id number]                      Room:        16  Gender:     Female                        Technician: [id number]  :        [id number]                   Requested By:PENG ARGUELLES  Order #:    [id number]                    Reading MD: PENG ARGUELLES MD    Measurements  Intervals                                Axis  Rate:       73                           P:          21  UT:         194                          QRS:        6  QRSD:       91                           T:          8  QT:         399  QTc:        440    Interpretive Statements  Sinus rhythm  Left ventricular hypertrophy  Compared to ECG 2022 11:56:26  Left ventricular hypertrophy now present  Electronically Signed On 2023 12:49:12 PST by PENG ARGUELLES MD        I have independently interpreted this EKG  Rhythm strip sinus rhythm        RADIOLOGY  I have independently interpreted the diagnostic imaging associated with this visit and am waiting the final reading from the radiologist.   My preliminary interpretation is a follows: Chest x-ray without infiltrate.  CT scan left ureteral stone  Radiologist interpretation:   CT-ABDOMEN-PELVIS WITH   Final Result      4 mm stone in the LEFT mid ureter with mild-to-moderate obstructive changes.            COURSE & MEDICAL DECISION MAKING    ED Observation Status? Yes; I am placing the patient in to an observation status due to a diagnostic uncertainty as well as therapeutic intensity. Patient placed in observation status at 920aM, 2023.     Observation plan is as follows: Obtain labs.  Obtain diagnostic imaging.  Treat symptoms.  Monitor for improvement.    Upon Reevaluation, the patient's condition has: Improved; and will be discharged.    Patient discharged from ED Observation status at 1:20PM 2023     INITIAL ASSESSMENT, COURSE AND PLAN  Care Narrative: Patient presents with left-sided abdominal pain.  Severe appears uncomfortable.  Differential includes diverticulitis, kidney stone, ischemia, pancreatitis, ACS etc.  Work-up was initiated.  Treat symptoms  with morphine and Zofran.  Hydrate with 500 cc LR.    Laboratory data with leukocytosis.  Continue with plan for CT scan.  Has elevated BUN.  Continue with plan for hydration.    CT scan shows left ureteral stone.  This is small enough that it should pass on its own.  Patient with persistent pain.  She was given Toradol.    Pain resolved.    Urinalysis was obtained no evidence of infection.    At this point after observation patient's pain has resolved.  Her stone is small enough that it should pass on its own.  Urinalysis is negative.  Vital signs are stable.  Appropriate for outpatient management.  Have given a prescription for Norco.  Advised NSAIDs as needed.  Plenty of fluids.  Standard instructions for kidney stone were given.  Patient is to return to the ER for recurrent uncontrolled pain, fevers or concern      HYDRATION: Based on the patient's presentation of Other abdominal pain, nausea vomiting the patient was given IV fluids. IV Hydration was used because oral hydration was not adequate alone. Upon recheck following hydration, the patient was improved.        DISPOSITION AND DISCUSSIONS    Discussion of management with other [facility] or appropriate source(s): Pharmacy reviewed medications prescribed      Escalation of care considered, and ultimately not performed:acute inpatient care management, however at this time, the patient is most appropriate for outpatient management    Barriers to care at this time, including but not limited to: Patient does not have established PCP.     Decision tools and prescription drugs considered including, but not limited to: Prescribed Norco.  No evidence of infection.  No indication for antibiotics.    FINAL DIAGNOSIS  Kidney stone       Electronically signed by: Damaso Martin M.D., 2/27/2023 9:21 AM

## 2023-02-27 NOTE — ED NOTES
ERP eval complete. Pt dozing, arouses to voice. O2 88% on ra while asleep. Pt placed on 2lnc. Sats improved to 96%. Pt states pain is a little better. Meds held for now.

## 2023-02-27 NOTE — ED TRIAGE NOTES
Pt bib ems from [facility].  Chief Complaint   Patient presents with    Abdominal Pain    N/V     LUQ pain. Onset last night. Pt feels urge to go to bathroom. +Vomit x1 PTA.  PTA Fent 75mcg + Zofran 4mg.    Pt in a gown. Connected to monitor. Chart up for ERP.

## 2025-05-16 ENCOUNTER — APPOINTMENT (OUTPATIENT)
Dept: RADIOLOGY | Facility: MEDICAL CENTER | Age: 75
End: 2025-05-16
Attending: STUDENT IN AN ORGANIZED HEALTH CARE EDUCATION/TRAINING PROGRAM
Payer: COMMERCIAL

## 2025-05-16 ENCOUNTER — PHARMACY VISIT (OUTPATIENT)
Dept: PHARMACY | Facility: MEDICAL CENTER | Age: 75
End: 2025-05-16
Payer: COMMERCIAL

## 2025-05-16 ENCOUNTER — HOSPITAL ENCOUNTER (EMERGENCY)
Facility: MEDICAL CENTER | Age: 75
End: 2025-05-16
Attending: STUDENT IN AN ORGANIZED HEALTH CARE EDUCATION/TRAINING PROGRAM
Payer: COMMERCIAL

## 2025-05-16 VITALS
RESPIRATION RATE: 14 BRPM | HEART RATE: 75 BPM | OXYGEN SATURATION: 95 % | DIASTOLIC BLOOD PRESSURE: 79 MMHG | BODY MASS INDEX: 29.84 KG/M2 | WEIGHT: 163.14 LBS | SYSTOLIC BLOOD PRESSURE: 136 MMHG | TEMPERATURE: 98.5 F

## 2025-05-16 DIAGNOSIS — S09.90XA CLOSED HEAD INJURY, INITIAL ENCOUNTER: ICD-10-CM

## 2025-05-16 DIAGNOSIS — S42.291A CLOSED FRACTURE OF HEAD OF RIGHT HUMERUS, INITIAL ENCOUNTER: ICD-10-CM
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
## 2025-05-16 PROCEDURE — 700101 HCHG RX REV CODE 250: Mod: UD | Performed by: STUDENT IN AN ORGANIZED HEALTH CARE EDUCATION/TRAINING PROGRAM

## 2025-05-16 PROCEDURE — 71045 X-RAY EXAM CHEST 1 VIEW: CPT

## 2025-05-16 PROCEDURE — 70450 CT HEAD/BRAIN W/O DYE: CPT

## 2025-05-16 PROCEDURE — 72131 CT LUMBAR SPINE W/O DYE: CPT

## 2025-05-16 PROCEDURE — 99284 EMERGENCY DEPT VISIT MOD MDM: CPT

## 2025-05-16 PROCEDURE — 73060 X-RAY EXAM OF HUMERUS: CPT | Mod: RT

## 2025-05-16 PROCEDURE — A9270 NON-COVERED ITEM OR SERVICE: HCPCS | Mod: UD | Performed by: STUDENT IN AN ORGANIZED HEALTH CARE EDUCATION/TRAINING PROGRAM

## 2025-05-16 PROCEDURE — 72125 CT NECK SPINE W/O DYE: CPT

## 2025-05-16 PROCEDURE — RXMED WILLOW AMBULATORY MEDICATION CHARGE: Performed by: STUDENT IN AN ORGANIZED HEALTH CARE EDUCATION/TRAINING PROGRAM

## 2025-05-16 PROCEDURE — 73030 X-RAY EXAM OF SHOULDER: CPT | Mod: RT

## 2025-05-16 PROCEDURE — 700102 HCHG RX REV CODE 250 W/ 637 OVERRIDE(OP): Mod: UD | Performed by: STUDENT IN AN ORGANIZED HEALTH CARE EDUCATION/TRAINING PROGRAM

## 2025-05-16 RX ORDER — ACETAMINOPHEN 500 MG
1000 TABLET ORAL ONCE
Status: COMPLETED | OUTPATIENT
Start: 2025-05-16 | End: 2025-05-16

## 2025-05-16 RX ORDER — ACETAMINOPHEN 500 MG
500-1000 TABLET ORAL EVERY 6 HOURS PRN
Qty: 30 TABLET | Refills: 0 | Status: SHIPPED | OUTPATIENT
Start: 2025-05-16

## 2025-05-16 RX ORDER — LIDOCAINE 4 G/G
1 PATCH TOPICAL ONCE
Status: DISCONTINUED | OUTPATIENT
Start: 2025-05-16 | End: 2025-05-16 | Stop reason: HOSPADM

## 2025-05-16 RX ADMIN — LIDOCAINE PAIN RELIEF 1 PATCH: 560 PATCH TOPICAL at 19:23

## 2025-05-16 RX ADMIN — ACETAMINOPHEN 1000 MG: 500 TABLET ORAL at 19:23

## 2025-05-17 NOTE — DISCHARGE INSTRUCTIONS
You were seen in the emergency room for evaluation after a fall.  The CAT scan of your head and neck shows no evidence of injury.  On your right shoulder, it does appear that you have an old fracture of the humeral head but there is a new fracture.  Please wear the sling as needed but make sure to do gentle range of motion exercises of your shoulder and elbow to prevent that they become frozen.  I have referred you to orthopedics for follow-up.  Please call Monday to schedule follow-up.  There is no evidence of fracture to your back.  You may take Tylenol 1000 mg every 6 hours for pain.

## 2025-05-17 NOTE — ED PROVIDER NOTES
ED Provider Note    CHIEF COMPLAINT  Chief Complaint   Patient presents with    T-5000 GLF     Pt reports GLF after tipping grocery cart on Wednesday.  Reports generalized pain. + head strike.        EXTERNAL RECORDS REVIEWED  Other Patient seen in the ER in 2022 after she fell while pushing her shopping cart    HPI/ROS  LIMITATION TO HISTORY   Select: : None  OUTSIDE HISTORIAN(S):  None    Yang Galeas is a 75 y.o. female who presents for evaluation after a ground-level fall that happened on Wednesday.  She states that she was pushing her shopping cart as she was walking home with her food items.  The shopping cart hit a bump and rolled forward and she rolled as well.  She hit the right side of her head.  She did not lose consciousness.  She also landed on her right shoulder.  She has history of right shoulder injury and she was told that she needed surgery on it however she never followed up.  She feels like this has gotten worse.  She took aspirin.  She is also having a bruise to the left upper leg but is able to ambulate.  She takes no daily medications.  She denies any numbness, tingling, weakness.  .    PAST MEDICAL HISTORY   has a past medical history of Anxiety and Asthma.    SURGICAL HISTORY   has a past surgical history that includes gyn surgery.    FAMILY HISTORY  No family history on file.    SOCIAL HISTORY  Social History     Tobacco Use    Smoking status: Never    Smokeless tobacco: Never   Vaping Use    Vaping status: Never Used   Substance and Sexual Activity    Alcohol use: Not Currently     Comment: occasional    Drug use: Not Currently     Types: Inhaled     Comment: occasional    Sexual activity: Not on file       CURRENT MEDICATIONS  Home Medications       Reviewed by Mary Valladares R.N. (Registered Nurse) on 05/16/25 at 3086  Med List Status: Partial     Medication Last Dose Status   albuterol 108 (90 Base) MCG/ACT Aero Soln inhalation aerosol  Active   ARIPiprazole (ABILIFY) 10 MG Tab   Active   Ascorbic Acid (VITAMIN C PO)  Active   Cyanocobalamin (VITAMIN B-12 PO)  Active   FLUoxetine (PROZAC) 10 MG Cap  Active   ibuprofen (MOTRIN) 800 MG Tab  Active   Thiamine HCl (VITAMIN B-1 PO)  Active   traZODone (DESYREL) 50 MG Tab  Active                  Audit from Redirected Encounters    **Home medications have not yet been reviewed for this encounter**         ALLERGIES  Allergies[1]    PHYSICAL EXAM  VITAL SIGNS: BP (!) 145/100   Pulse 92   Temp 37.2 °C (99 °F) (Temporal)   Resp 16   Wt 74 kg (163 lb 2.3 oz)   SpO2 91%   BMI 29.84 kg/m²    Constitutional: GCS 15, ABC's intact   HENT: Normocephalic, atraumatic, external ears normal, no facial tenderness palpation, no nasal septal hematoma  Eyes: PERRLA, EOMI, Conjunctiva normal, No discharge.   Neck: No midline C-spine tenderness, supple, deformity, full range of motion  Cardiovascular: Normal heart rate, Normal rhythm, No murmurs, No rubs, No gallops.   Thorax & Lungs: Normal breath sounds, No respiratory distress, No wheezing, No chest tenderness. No subcutaneous emphysema or paradoxical chest wall movements  Abdomen:  Soft, No tenderness, No masses, No pulsatile masses, no abdominal wall contusions  Skin: Warm, Dry, No erythema, No rash no contusions or abrasions, healing ecchymoses to the left thigh  Back: No midline T-spine tenderness, step-off, deformity, midline L-spine tenderness without step-off or deformity  Extremities: Intact distal pulses, No edema  Musculoskeletal: Left upper extremity is atraumatic with no tenderness and has full range of motion of all major joints, right upper extremity has tenderness overlying the right shoulder with decreased range of motion secondary to pain, mild tenderness to the proximal humerus, no tenderness to the distal humerus, elbow, forearm, wrist, hand, bilateral lower extremities have no significant tenderness in the bony aspect, able to ambulate without difficulty  Neurologic: Alert & oriented x  3, Normal motor function, Normal sensory function, No focal deficits noted.  Able to ambulate without difficulty      RADIOLOGY/PROCEDURES   I have independently interpreted the diagnostic imaging associated with this visit and am waiting the final reading from the radiologist.   My preliminary interpretation is as follows: no ICH    Radiologist interpretation:  CT-LSPINE W/O PLUS RECONS   Final Result      1.  No acute fracture or dislocation of the lumbar spine.   2.  Degenerative changes as detailed.      CT-CSPINE WITHOUT PLUS RECONS   Final Result      Degenerative changes without acute fracture or dislocation of the cervical spine.      CT-HEAD W/O   Final Result      1.  Cerebral atrophy and chronic microvascular ischemic type changes.   2.  No acute intracranial abnormality.               DX-HUMERUS 2+ RIGHT   Final Result      1.  Possible nondisplaced acute on chronic fracture of the humeral head.   2.  Osteopenia.      DX-SHOULDER 2+ RIGHT   Final Result      No obvious fracture or dislocation but evaluation is limited due to severe osseous demineralization. If there is concern for occult fracture, further evaluation with CT recommended.      DX-CHEST-PORTABLE (1 VIEW)   Final Result      Diffuse interstitial prominence and groundglass opacity throughout both lungs could relate to pulmonary edema or chronic interstitial lung disease.          COURSE & MEDICAL DECISION MAKING    ASSESSMENT, COURSE AND PLAN  Care Narrative:   This is a 75-year-old female with history as noted above who is presenting for evaluation after she fell while pushing a shopping cart 2 days ago.  She did hit her head.  On arrival, her vital signs are stable.  She is nontoxic in appearance.  She is GCS 15.  She has a normal neurologic exam.  She is a chronic injury of the right shoulder and feels like she has exacerbated this.    CT head was obtained as she did hit her head but she does not lose consciousness.  Unable to rule out  C-spine injury given her age therefore CT C-spine was also obtained.  Thankfully this shows no evidence of ICH or skull fracture or any C-spine fracture.  Her CT L-spine is negative for acute fracture.  She does have some bruising to the left thigh however she is able to bear weight without difficulty therefore I doubt fracture of the lower extremity.  No x-rays were performed.  Chest x-ray was performed shows no evidence of pneumothorax, rib fracture.  Right shoulder and right humerus were imaged and it does appear that she has acute on chronic fracture of the humeral head.  Imaging is limited due to patient's osteopenia.  Given this is where patient is having pain, we will presume that she has acute fracture of the humeral head and she likely has chronic fracture due to injury years ago.  Will go ahead and place her in a splint, have her follow-up with orthopedics and I will place a referral.  I have prescribed her Tylenol.  She is advised to do gentle range of motion exercises of the elbow to prevent any frozen elbow.    Patient is otherwise feeling well.  She is ambulatory and pain is well-controlled.  She is comfortable with discharge plan home.  She is to return for any new or worsening symptoms.  Patient is agreeable to discharge plan with no further questions.    I did also discuss with her that her imaging showed evidence of osteopenia and I am concerned that she may have osteoporosis.  Patient has never heard of that term before therefore was educated and provided my concern that this makes her more prone to fractures.  I did advise her to follow-up with her primary care doctor for further testing as she may be started on medication.  Patient voices understanding.            ADDITIONAL PROBLEMS MANAGED  None    DISPOSITION AND DISCUSSIONS  I have discussed management of the patient with the following physicians and MC's:    None    Discussion of management with other [facility] or appropriate source(s): None      Escalation of care considered, and ultimately not performed:Laboratory analysis    Barriers to care at this time, including but not limited to: None.     Decision tools and prescription drugs considered including, but not limited to: None.     The patient will return for new or worsening symptoms and is stable at the time of discharge.    The patient is referred to a primary physician for blood pressure management, diabetic screening, and for all other preventative health concerns.      DISPOSITION:  Patient will be discharged home in stable condition.    FOLLOW UP:  Zachary Woods M.D.  [address] [facility] [id number] [phone]          [facility], Emergency Dept  [address] [facility] [address]  [phone]          OUTPATIENT MEDICATIONS:  Discharge Medication List as of 5/16/2025  9:37 PM        START taking these medications    Details   acetaminophen (TYLENOL) 500 MG Tab Take 1-2 Tablets by mouth every 6 hours as needed for Mild Pain., Disp-30 Tablet, R-0, Normal               FINAL DIAGNOSIS  1. Fall, initial encounter Acute   2. Closed head injury, initial encounter Acute   3. Closed fracture of head of right humerus, initial encounter Acute        Electronically signed by: Kathryn Johnson M.D., 5/16/2025 7:21 PM           [1] No Known Allergies

## 2025-05-17 NOTE — ED TRIAGE NOTES
Chief Complaint   Patient presents with    T-5000 GLF     Pt reports GLF after tipping grocery cart on Wednesday.  Reports generalized pain. + head strike.      GCS 15. Bruising noted to bilateral legs.   BP (!) 145/100   Pulse 92   Temp 37.2 °C (99 °F) (Temporal)   Resp 16   Wt 74 kg (163 lb 2.3 oz)   SpO2 91%   Pt informed of wait times. Educated on triage process.  Asked to return to triage RN for any new or worsening of symptoms. Thanked for patience.

## 2025-05-17 NOTE — ED NOTES
Discharge instructions reviewed with patient and signed. They verbalized understanding of follow up instructions. All belongings with patient. They were instructed on how to  prescriptions. Sling placed on R arm by tech. They ambulated with a steady gait to RUSTAM felix.

## 2025-06-10 ENCOUNTER — APPOINTMENT (OUTPATIENT)
Dept: CARDIOLOGY | Facility: MEDICAL CENTER | Age: 75
End: 2025-06-10
Attending: HOSPITALIST
Payer: COMMERCIAL

## 2025-06-10 ENCOUNTER — APPOINTMENT (OUTPATIENT)
Dept: RADIOLOGY | Facility: MEDICAL CENTER | Age: 75
End: 2025-06-10
Attending: EMERGENCY MEDICINE
Payer: COMMERCIAL

## 2025-06-10 ENCOUNTER — HOSPITAL ENCOUNTER (OUTPATIENT)
Facility: MEDICAL CENTER | Age: 75
End: 2025-06-11
Attending: EMERGENCY MEDICINE | Admitting: HOSPITALIST
Payer: COMMERCIAL

## 2025-06-10 DIAGNOSIS — R06.00 DYSPNEA, UNSPECIFIED TYPE: Primary | ICD-10-CM

## 2025-06-10 DIAGNOSIS — I50.9 ACUTE CONGESTIVE HEART FAILURE, UNSPECIFIED HEART FAILURE TYPE (HCC): ICD-10-CM

## 2025-06-10 PROBLEM — J45.20 MILD INTERMITTENT ASTHMA WITHOUT COMPLICATION: Status: ACTIVE | Noted: 2025-06-10

## 2025-06-10 LAB
ALBUMIN SERPL BCP-MCNC: 4.1 G/DL (ref 3.2–4.9)
ALBUMIN/GLOB SERPL: 1.2 G/DL
ALP SERPL-CCNC: 100 U/L (ref 30–99)
ALT SERPL-CCNC: 16 U/L (ref 2–50)
ANION GAP SERPL CALC-SCNC: 10 MMOL/L (ref 7–16)
AST SERPL-CCNC: 24 U/L (ref 12–45)
BASOPHILS # BLD AUTO: 0.6 % (ref 0–1.8)
BASOPHILS # BLD: 0.06 K/UL (ref 0–0.12)
BILIRUB SERPL-MCNC: 0.8 MG/DL (ref 0.1–1.5)
BUN SERPL-MCNC: 15 MG/DL (ref 8–22)
CALCIUM ALBUM COR SERPL-MCNC: 9.2 MG/DL (ref 8.5–10.5)
CALCIUM SERPL-MCNC: 9.3 MG/DL (ref 8.5–10.5)
CHLORIDE SERPL-SCNC: 111 MMOL/L (ref 96–112)
CO2 SERPL-SCNC: 22 MMOL/L (ref 20–33)
CREAT SERPL-MCNC: 0.75 MG/DL (ref 0.5–1.4)
EKG IMPRESSION: NORMAL
EOSINOPHIL # BLD AUTO: 0.25 K/UL (ref 0–0.51)
EOSINOPHIL NFR BLD: 2.5 % (ref 0–6.9)
ERYTHROCYTE [DISTWIDTH] IN BLOOD BY AUTOMATED COUNT: 42.9 FL (ref 35.9–50)
FLUAV RNA SPEC QL NAA+PROBE: NEGATIVE
FLUBV RNA SPEC QL NAA+PROBE: NEGATIVE
GFR SERPLBLD CREATININE-BSD FMLA CKD-EPI: 82 ML/MIN/1.73 M 2
GLOBULIN SER CALC-MCNC: 3.3 G/DL (ref 1.9–3.5)
GLUCOSE SERPL-MCNC: 106 MG/DL (ref 65–99)
HCT VFR BLD AUTO: 39.5 % (ref 37–47)
HGB BLD-MCNC: 13 G/DL (ref 12–16)
IMM GRANULOCYTES # BLD AUTO: 0.04 K/UL (ref 0–0.11)
IMM GRANULOCYTES NFR BLD AUTO: 0.4 % (ref 0–0.9)
LYMPHOCYTES # BLD AUTO: 1.92 K/UL (ref 1–4.8)
LYMPHOCYTES NFR BLD: 19.4 % (ref 22–41)
MCH RBC QN AUTO: 29.7 PG (ref 27–33)
MCHC RBC AUTO-ENTMCNC: 32.9 G/DL (ref 32.2–35.5)
MCV RBC AUTO: 90.2 FL (ref 81.4–97.8)
MONOCYTES # BLD AUTO: 0.83 K/UL (ref 0–0.85)
MONOCYTES NFR BLD AUTO: 8.4 % (ref 0–13.4)
NEUTROPHILS # BLD AUTO: 6.78 K/UL (ref 1.82–7.42)
NEUTROPHILS NFR BLD: 68.7 % (ref 44–72)
NRBC # BLD AUTO: 0 K/UL
NRBC BLD-RTO: 0 /100 WBC (ref 0–0.2)
NT-PROBNP SERPL IA-MCNC: 587 PG/ML (ref 0–125)
PLATELET # BLD AUTO: 218 K/UL (ref 164–446)
PMV BLD AUTO: 10.4 FL (ref 9–12.9)
POTASSIUM SERPL-SCNC: 3.9 MMOL/L (ref 3.6–5.5)
PROT SERPL-MCNC: 7.4 G/DL (ref 6–8.2)
RBC # BLD AUTO: 4.38 M/UL (ref 4.2–5.4)
RSV RNA SPEC QL NAA+PROBE: NEGATIVE
SARS-COV-2 RNA RESP QL NAA+PROBE: NOTDETECTED
SODIUM SERPL-SCNC: 143 MMOL/L (ref 135–145)
TROPONIN T SERPL-MCNC: 19 NG/L (ref 6–19)
TSH SERPL-ACNC: 2.24 UIU/ML (ref 0.38–5.33)
WBC # BLD AUTO: 9.9 K/UL (ref 4.8–10.8)

## 2025-06-10 PROCEDURE — 93005 ELECTROCARDIOGRAM TRACING: CPT | Mod: TC

## 2025-06-10 PROCEDURE — 96372 THER/PROPH/DIAG INJ SC/IM: CPT | Mod: XU

## 2025-06-10 PROCEDURE — 0241U HCHG SARS-COV-2 COVID-19 NFCT DS RESP RNA 4 TRGT ED POC: CPT

## 2025-06-10 PROCEDURE — 93306 TTE W/DOPPLER COMPLETE: CPT

## 2025-06-10 PROCEDURE — G0378 HOSPITAL OBSERVATION PER HR: HCPCS

## 2025-06-10 PROCEDURE — 93306 TTE W/DOPPLER COMPLETE: CPT | Mod: 26 | Performed by: STUDENT IN AN ORGANIZED HEALTH CARE EDUCATION/TRAINING PROGRAM

## 2025-06-10 PROCEDURE — 85025 COMPLETE CBC W/AUTO DIFF WBC: CPT

## 2025-06-10 PROCEDURE — 700102 HCHG RX REV CODE 250 W/ 637 OVERRIDE(OP): Mod: UD | Performed by: HOSPITALIST

## 2025-06-10 PROCEDURE — 99222 1ST HOSP IP/OBS MODERATE 55: CPT | Performed by: HOSPITALIST

## 2025-06-10 PROCEDURE — 700111 HCHG RX REV CODE 636 W/ 250 OVERRIDE (IP): Mod: JZ,UD | Performed by: EMERGENCY MEDICINE

## 2025-06-10 PROCEDURE — 84443 ASSAY THYROID STIM HORMONE: CPT

## 2025-06-10 PROCEDURE — 71045 X-RAY EXAM CHEST 1 VIEW: CPT

## 2025-06-10 PROCEDURE — 700111 HCHG RX REV CODE 636 W/ 250 OVERRIDE (IP): Mod: JZ,UD | Performed by: HOSPITALIST

## 2025-06-10 PROCEDURE — A9270 NON-COVERED ITEM OR SERVICE: HCPCS | Mod: UD | Performed by: HOSPITALIST

## 2025-06-10 PROCEDURE — 99285 EMERGENCY DEPT VISIT HI MDM: CPT

## 2025-06-10 PROCEDURE — 36415 COLL VENOUS BLD VENIPUNCTURE: CPT

## 2025-06-10 PROCEDURE — 93005 ELECTROCARDIOGRAM TRACING: CPT | Mod: TC | Performed by: EMERGENCY MEDICINE

## 2025-06-10 PROCEDURE — 96374 THER/PROPH/DIAG INJ IV PUSH: CPT | Mod: XU

## 2025-06-10 PROCEDURE — 80053 COMPREHEN METABOLIC PANEL: CPT

## 2025-06-10 PROCEDURE — 83880 ASSAY OF NATRIURETIC PEPTIDE: CPT

## 2025-06-10 PROCEDURE — 84484 ASSAY OF TROPONIN QUANT: CPT

## 2025-06-10 RX ORDER — HYDRALAZINE HYDROCHLORIDE 20 MG/ML
10 INJECTION INTRAMUSCULAR; INTRAVENOUS EVERY 4 HOURS PRN
Status: DISCONTINUED | OUTPATIENT
Start: 2025-06-10 | End: 2025-06-11 | Stop reason: HOSPADM

## 2025-06-10 RX ORDER — ENOXAPARIN SODIUM 100 MG/ML
40 INJECTION SUBCUTANEOUS DAILY
Status: DISCONTINUED | OUTPATIENT
Start: 2025-06-10 | End: 2025-06-11 | Stop reason: HOSPADM

## 2025-06-10 RX ORDER — ONDANSETRON 2 MG/ML
4 INJECTION INTRAMUSCULAR; INTRAVENOUS EVERY 4 HOURS PRN
Status: DISCONTINUED | OUTPATIENT
Start: 2025-06-10 | End: 2025-06-11 | Stop reason: HOSPADM

## 2025-06-10 RX ORDER — ALBUTEROL SULFATE 90 UG/1
2 INHALANT RESPIRATORY (INHALATION) EVERY 6 HOURS PRN
COMMUNITY

## 2025-06-10 RX ORDER — OSELTAMIVIR PHOSPHATE 75 MG/1
75 CAPSULE ORAL ONCE
Status: DISCONTINUED | OUTPATIENT
Start: 2025-06-10 | End: 2025-06-10

## 2025-06-10 RX ORDER — FUROSEMIDE 10 MG/ML
20 INJECTION INTRAMUSCULAR; INTRAVENOUS 2 TIMES DAILY
Status: DISCONTINUED | OUTPATIENT
Start: 2025-06-11 | End: 2025-06-11 | Stop reason: HOSPADM

## 2025-06-10 RX ORDER — ACETAMINOPHEN 325 MG/1
650 TABLET ORAL EVERY 6 HOURS PRN
Status: DISCONTINUED | OUTPATIENT
Start: 2025-06-10 | End: 2025-06-11 | Stop reason: HOSPADM

## 2025-06-10 RX ORDER — IBUPROFEN 200 MG
800 TABLET ORAL EVERY 6 HOURS PRN
COMMUNITY

## 2025-06-10 RX ORDER — FUROSEMIDE 10 MG/ML
20 INJECTION INTRAMUSCULAR; INTRAVENOUS ONCE
Status: COMPLETED | OUTPATIENT
Start: 2025-06-10 | End: 2025-06-10

## 2025-06-10 RX ORDER — POTASSIUM CHLORIDE 1500 MG/1
20 TABLET, EXTENDED RELEASE ORAL 2 TIMES DAILY
Status: DISCONTINUED | OUTPATIENT
Start: 2025-06-10 | End: 2025-06-11 | Stop reason: HOSPADM

## 2025-06-10 RX ORDER — ACETAMINOPHEN 325 MG/1
650 TABLET ORAL ONCE
Status: DISCONTINUED | OUTPATIENT
Start: 2025-06-10 | End: 2025-06-10

## 2025-06-10 RX ORDER — ONDANSETRON 4 MG/1
4 TABLET, ORALLY DISINTEGRATING ORAL EVERY 4 HOURS PRN
Status: DISCONTINUED | OUTPATIENT
Start: 2025-06-10 | End: 2025-06-11 | Stop reason: HOSPADM

## 2025-06-10 RX ADMIN — ENOXAPARIN SODIUM 40 MG: 100 INJECTION SUBCUTANEOUS at 17:19

## 2025-06-10 RX ADMIN — FUROSEMIDE 20 MG: 10 INJECTION, SOLUTION INTRAVENOUS at 14:51

## 2025-06-10 RX ADMIN — ACETAMINOPHEN 650 MG: 325 TABLET ORAL at 20:34

## 2025-06-10 RX ADMIN — POTASSIUM CHLORIDE 20 MEQ: 1500 TABLET, EXTENDED RELEASE ORAL at 17:19

## 2025-06-10 SDOH — ECONOMIC STABILITY: TRANSPORTATION INSECURITY
IN THE PAST 12 MONTHS, HAS LACK OF RELIABLE TRANSPORTATION KEPT YOU FROM MEDICAL APPOINTMENTS, MEETINGS, WORK OR FROM GETTING THINGS NEEDED FOR DAILY LIVING?: PATIENT DECLINED

## 2025-06-10 SDOH — ECONOMIC STABILITY: TRANSPORTATION INSECURITY
IN THE PAST 12 MONTHS, HAS THE LACK OF TRANSPORTATION KEPT YOU FROM MEDICAL APPOINTMENTS OR FROM GETTING MEDICATIONS?: PATIENT DECLINED

## 2025-06-10 ASSESSMENT — SOCIAL DETERMINANTS OF HEALTH (SDOH)
WITHIN THE PAST 12 MONTHS, YOU WORRIED THAT YOUR FOOD WOULD RUN OUT BEFORE YOU GOT THE MONEY TO BUY MORE: PATIENT DECLINED
WITHIN THE LAST YEAR, HAVE YOU BEEN KICKED, HIT, SLAPPED, OR OTHERWISE PHYSICALLY HURT BY YOUR PARTNER OR EX-PARTNER?: PATIENT DECLINED
WITHIN THE LAST YEAR, HAVE YOU BEEN HUMILIATED OR EMOTIONALLY ABUSED IN OTHER WAYS BY YOUR PARTNER OR EX-PARTNER?: PATIENT DECLINED
WITHIN THE LAST YEAR, HAVE TO BEEN RAPED OR FORCED TO HAVE ANY KIND OF SEXUAL ACTIVITY BY YOUR PARTNER OR EX-PARTNER?: PATIENT DECLINED
WITHIN THE PAST 12 MONTHS, THE FOOD YOU BOUGHT JUST DIDN'T LAST AND YOU DIDN'T HAVE MONEY TO GET MORE: PATIENT DECLINED
IN THE PAST 12 MONTHS, HAS THE ELECTRIC, GAS, OIL, OR WATER COMPANY THREATENED TO SHUT OFF SERVICE IN YOUR HOME?: PATIENT DECLINED
WITHIN THE LAST YEAR, HAVE YOU BEEN AFRAID OF YOUR PARTNER OR EX-PARTNER?: PATIENT DECLINED

## 2025-06-10 ASSESSMENT — ENCOUNTER SYMPTOMS
SHORTNESS OF BREATH: 1
CONSTITUTIONAL NEGATIVE: 1
PSYCHIATRIC NEGATIVE: 1
EYES NEGATIVE: 1
NEUROLOGICAL NEGATIVE: 1
MUSCULOSKELETAL NEGATIVE: 1
ORTHOPNEA: 1
GASTROINTESTINAL NEGATIVE: 1

## 2025-06-10 ASSESSMENT — COGNITIVE AND FUNCTIONAL STATUS - GENERAL
CLIMB 3 TO 5 STEPS WITH RAILING: A LITTLE
DAILY ACTIVITIY SCORE: 24
SUGGESTED CMS G CODE MODIFIER DAILY ACTIVITY: CH
SUGGESTED CMS G CODE MODIFIER MOBILITY: CJ
MOBILITY SCORE: 22
WALKING IN HOSPITAL ROOM: A LITTLE

## 2025-06-10 ASSESSMENT — LIFESTYLE VARIABLES
HOW MANY TIMES IN THE PAST YEAR HAVE YOU HAD 5 OR MORE DRINKS IN A DAY: 0
HAVE YOU EVER FELT YOU SHOULD CUT DOWN ON YOUR DRINKING: NO
CONSUMPTION TOTAL: NEGATIVE
ON A TYPICAL DAY WHEN YOU DRINK ALCOHOL HOW MANY DRINKS DO YOU HAVE: 0
TOTAL SCORE: 0
HAVE PEOPLE ANNOYED YOU BY CRITICIZING YOUR DRINKING: NO
EVER HAD A DRINK FIRST THING IN THE MORNING TO STEADY YOUR NERVES TO GET RID OF A HANGOVER: NO
TOTAL SCORE: 0
TOTAL SCORE: 0
ALCOHOL_USE: NO
EVER FELT BAD OR GUILTY ABOUT YOUR DRINKING: NO
DOES PATIENT WANT TO STOP DRINKING: NO
AVERAGE NUMBER OF DAYS PER WEEK YOU HAVE A DRINK CONTAINING ALCOHOL: 0

## 2025-06-10 ASSESSMENT — PATIENT HEALTH QUESTIONNAIRE - PHQ9
SUM OF ALL RESPONSES TO PHQ9 QUESTIONS 1 AND 2: 0
2. FEELING DOWN, DEPRESSED, IRRITABLE, OR HOPELESS: NOT AT ALL
1. LITTLE INTEREST OR PLEASURE IN DOING THINGS: NOT AT ALL

## 2025-06-10 ASSESSMENT — PAIN DESCRIPTION - PAIN TYPE
TYPE: ACUTE PAIN
TYPE: ACUTE PAIN

## 2025-06-10 NOTE — ASSESSMENT & PLAN NOTE
Suspect related to CHF    Monitor I's and O's    Daily weights    Check echocardiogram    Diuresis with Lasix 20 mg IV every 12 hours  Check TSH

## 2025-06-10 NOTE — ED NOTES
Pt to room in WC w/ family. IV start successful. Blood to lab.    impaired balance/decreased strength

## 2025-06-10 NOTE — ED NOTES
Verified cepheid results on machine since data is not crossing over at this time:  Flu A- Negative  Flu B- Negative  Covid- Negative  RSV- Negative

## 2025-06-10 NOTE — PROGRESS NOTES
4 Eyes Skin Assessment Completed by SAUL Curtis and SAUL Ireland.    Skin assessment is primarily focused on high risk bony prominences.     Head (Occipital):  WDL   Ears (Under Medical Devices): WDL   Nose (Under Medical Devices): WDL   Mouth:  WDL   Neck: WDL   Breast/Chest:  WDL   Shoulder Blades:  WDL   Spine:   WDL   (R) Arm/Elbow/Hand: WDL   (L) Arm/Elbow/Hand: WDL   Abdomen: WDL   Pannus/Groin:  WDL   Sacrum/Coccyx:   WDL   (R) Ischial Tuberosity (Sit Bones):  WDL   (L) Ischial Tuberosity (Sit Bones):  WDL   (R) Leg:  WDL   (L) Leg:  WDL   (R) Heel:  WDL   (R) Foot/Toe: WDL   (L) Heel: WDL   (L) Foot/Toe:  WDL       DEVICES IN USE:   Respiratory Devices:  NA, patient on room air  Feeding Devices:  N/A   Lines & BP Monitoring Devices:  Peripheral IV    Orthopedic Devices:  N/A  Miscellaneous Devices:  N/A    PROTOCOL INTERVENTIONS:   Standard/Trauma Bed: Already in place    WOUND PHOTOS:   N/A no wounds identified    WOUND CONSULT:   N/A, no advanced wound care needs identified

## 2025-06-10 NOTE — ED TRIAGE NOTES
Chief Complaint   Patient presents with    Shortness of Breath     Pt reports SOB since last night. +chest pain    Wheeled to triage for above complaint with family. Pt reports she has had SOB since last night with chest pain. Pt reports she has had the same in the past but it usually goes away on its own. History of asthma. -cough    Pt placed in lobby and educated on triage process. Pt encouraged to alert staff for any changes.    Protocol ordered.     Patient and staff wearing appropriate PPE.

## 2025-06-10 NOTE — PROGRESS NOTES
Pt arrived to unit via stretcher at 1520. Pt oriented to room, unit, and plan of care. Tele-monitor placed and monitor room notified. All questions answered at this time. Call light within reach; fall precautions in place.

## 2025-06-10 NOTE — H&P
Hospital Medicine History & Physical Note    Date of Service  6/10/2025    Primary Care Physician  Radha Merida P.A.-C.    Consultants      Code Status  No Order    Chief Complaint  Chief Complaint   Patient presents with    Shortness of Breath     Pt reports SOB since last night. +chest pain       History of Presenting Illness  Yang Galeas is a 75 y.o. female who presented 6/10/2025 with past medical of asthma who presents with complaints of shortness of breath.  Patient says that for many years she has had history of asthma and can deal with chronic shortness of breath however for the last couple days her shortness of breath has gotten worse.  Albuterol inhaler not very infected.  She was evaluated emergency department at [facility].  She was not hypoxic she was not tachycardic however her x-ray showed evidence of edema and she had an elevated BNP.  Patient referred to me for the care and management.  Patient denies PND but did admit to orthopnea.    Patient received IV Lasix in the emergency department and did noted increased urinary frequency and improvement in her shortness of breath.    Patient referred to me for further care and management      I discussed the plan of care with patient.    Review of Systems  Review of Systems   Constitutional: Negative.    Eyes: Negative.    Respiratory:  Positive for shortness of breath.    Cardiovascular:  Positive for orthopnea.   Gastrointestinal: Negative.    Genitourinary: Negative.    Musculoskeletal: Negative.    Neurological: Negative.    Psychiatric/Behavioral: Negative.         Past Medical History   has a past medical history of Anxiety and Asthma.    Surgical History   has a past surgical history that includes gyn surgery.     Family History    Family history reviewed with patient. There is no family history that is pertinent to the chief complaint.     Social History   reports that she has never smoked. She has never used smokeless tobacco. She reports that  she does not currently use alcohol. She reports that she does not currently use drugs after having used the following drugs: Inhaled.    Allergies  Allergies[1]    Medications  Prior to Admission Medications   Prescriptions Last Dose Informant Patient Reported? Taking?   acetaminophen (TYLENOL) 500 MG Tab 6/4/2025 Patient No Yes   Sig: Take 1-2 Tablets by mouth every 6 hours as needed for Mild Pain.   albuterol 108 (90 Base) MCG/ACT Aero Soln inhalation aerosol 6/8/2025 Patient Yes Yes   Sig: Inhale 2 Puffs every 6 hours as needed for Shortness of Breath.   ibuprofen (MOTRIN) 200 MG Tab 6/8/2025 Patient Yes Yes   Sig: Take 800 mg by mouth every 6 hours as needed for Mild Pain.      Facility-Administered Medications: None       Physical Exam  Temp:  [36.1 °C (96.9 °F)-36.2 °C (97.2 °F)] 36.1 °C (96.9 °F)  Pulse:  [78-99] 78  Resp:  [20-22] 20  BP: (165-184)/(81-93) 173/81  SpO2:  [92 %-97 %] 97 %  Blood Pressure : (!) 173/81   Temperature: 36.1 °C (96.9 °F)   Pulse: 78   Respiration: 20 (Shallow breaths)   Pulse Oximetry: 97 %       Physical Exam  Vitals reviewed.   Constitutional:       General: She is not in acute distress.     Appearance: Normal appearance. She is not ill-appearing, toxic-appearing or diaphoretic.   Eyes:      General: No scleral icterus.  Cardiovascular:      Rate and Rhythm: Normal rate and regular rhythm.      Heart sounds: No murmur heard.     No friction rub. No gallop.   Pulmonary:      Breath sounds: Rhonchi present.   Abdominal:      General: There is no distension.      Palpations: There is no mass.      Tenderness: There is no abdominal tenderness.      Hernia: No hernia is present.   Musculoskeletal:      Cervical back: Normal range of motion.      Right lower leg: Edema (trace) present.      Left lower leg: Edema (trace) present.   Skin:     Capillary Refill: Capillary refill takes 2 to 3 seconds.      Coloration: Skin is not jaundiced.      Findings: No bruising.   Neurological:       General: No focal deficit present.      Mental Status: She is alert.      Cranial Nerves: No cranial nerve deficit.      Gait: Gait normal.   Psychiatric:         Mood and Affect: Mood normal.         Behavior: Behavior normal.         Laboratory:  Recent Labs     06/10/25  1056   WBC 9.9   RBC 4.38   HEMOGLOBIN 13.0   HEMATOCRIT 39.5   MCV 90.2   MCH 29.7   MCHC 32.9   RDW 42.9   PLATELETCT 218   MPV 10.4     Recent Labs     06/10/25  1056   SODIUM 143   POTASSIUM 3.9   CHLORIDE 111   CO2 22   GLUCOSE 106*   BUN 15   CREATININE 0.75   CALCIUM 9.3     Recent Labs     06/10/25  1056   ALTSGPT 16   ASTSGOT 24   ALKPHOSPHAT 100*   TBILIRUBIN 0.8   GLUCOSE 106*         Recent Labs     06/10/25  1056   NTPROBNP 587*         Recent Labs     06/10/25  1056   TROPONINT 19       Imaging:  DX-CHEST-PORTABLE (1 VIEW)   Final Result      Mildly increased interstitial infiltrates and/or edema.          X-Ray:  I have personally reviewed the images and compared with prior images.      EKG reviewed by me- nsr rate 74      Assessment/Plan:  Justification for Admission Status  I anticipate this patient is appropriate for observation status at this time because       * Dyspnea- (present on admission)  Assessment & Plan  Suspect related to CHF    Monitor I's and O's    Daily weights    Check echocardiogram    Diuresis with Lasix 20 mg IV every 12 hours  Check TSH    Mild intermittent asthma without complication- (present on admission)  Assessment & Plan  Does not appear to be in acute exacerbation.    Continue albuterol        VTE prophylaxis: SCDs/TEDs         [1] No Known Allergies

## 2025-06-10 NOTE — ED PROVIDER NOTES
"ED PHYSICIAN NOTE    CHIEF COMPLAINT  Chief Complaint   Patient presents with    Shortness of Breath     Pt reports SOB since last night. +chest pain       HPI/ROS      Yang Galeas is a 75 y.o. female who presents with shortness of breath.  Patient has a history of chronic shortness of breath.  Symptoms worse over the last few days.  Increased with a lay down and during sleep.  She has had a cough that is nonproductive.  No fevers chills.  Denies leg swelling but feels like her abdomen is little swollen.  Feels some pain across her chest particularly when she lays down or coughs.  Has not had nausea vomiting diarrhea.  Normal bowel movements.    PAST MEDICAL HISTORY  Past Medical History[1]    SOCIAL HISTORY  Social History[2]    CURRENT MEDICATIONS  Home Medications    **Home medications have not yet been reviewed for this encounter**       Audit from Redirected Encounters    **Home medications have not yet been reviewed for this encounter**         ALLERGIES  Allergies[3]    PHYSICAL EXAM  VITAL SIGNS: BP (!) 184/83   Pulse 80   Temp 36.2 °C (97.2 °F) (Temporal)   Resp 20   Ht 1.549 m (5' 1\")   Wt 74.8 kg (165 lb)   SpO2 93%   BMI 31.18 kg/m²    Constitutional: Awake and alert  HENT: Normal inspection  Eyes: Normal inspection  Neck: Grossly normal range of motion.  Cardiovascular: Mildly elevated heart rate, Normal rhythm.  Symmetric peripheral pulses.   Thorax & Lungs: No respiratory distress, No wheezing, rales at both bases.  Abdomen: Bowel sounds normal, soft, non-distended, nontender, no mass  Skin: No rash.  Back: No tenderness, No CVA tenderness.   Extremities: Remarkable edema.  No Homans or cords  Neurologic: Grossly normal   Psychiatric: She appears anxious    DIAGNOSTIC STUDIES / PROCEDURES  LABS/EKG  Results for orders placed or performed during the hospital encounter of 06/10/25   CBC with Differential    Collection Time: 06/10/25 10:56 AM   Result Value Ref Range    WBC 9.9 4.8 - 10.8 K/uL "    RBC 4.38 4.20 - 5.40 M/uL    Hemoglobin 13.0 12.0 - 16.0 g/dL    Hematocrit 39.5 37.0 - 47.0 %    MCV 90.2 81.4 - 97.8 fL    MCH 29.7 27.0 - 33.0 pg    MCHC 32.9 32.2 - 35.5 g/dL    RDW 42.9 35.9 - 50.0 fL    Platelet Count 218 164 - 446 K/uL    MPV 10.4 9.0 - 12.9 fL    Neutrophils-Polys 68.70 44.00 - 72.00 %    Lymphocytes 19.40 (L) 22.00 - 41.00 %    Monocytes 8.40 0.00 - 13.40 %    Eosinophils 2.50 0.00 - 6.90 %    Basophils 0.60 0.00 - 1.80 %    Immature Granulocytes 0.40 0.00 - 0.90 %    Nucleated RBC 0.00 0.00 - 0.20 /100 WBC    Neutrophils (Absolute) 6.78 1.82 - 7.42 K/uL    Lymphs (Absolute) 1.92 1.00 - 4.80 K/uL    Monos (Absolute) 0.83 0.00 - 0.85 K/uL    Eos (Absolute) 0.25 0.00 - 0.51 K/uL    Baso (Absolute) 0.06 0.00 - 0.12 K/uL    Immature Granulocytes (abs) 0.04 0.00 - 0.11 K/uL    NRBC (Absolute) 0.00 K/uL   Comp Metabolic Panel    Collection Time: 06/10/25 10:56 AM   Result Value Ref Range    Sodium 143 135 - 145 mmol/L    Potassium 3.9 3.6 - 5.5 mmol/L    Chloride 111 96 - 112 mmol/L    Co2 22 20 - 33 mmol/L    Anion Gap 10.0 7.0 - 16.0    Glucose 106 (H) 65 - 99 mg/dL    Bun 15 8 - 22 mg/dL    Creatinine 0.75 0.50 - 1.40 mg/dL    Calcium 9.3 8.5 - 10.5 mg/dL    Correct Calcium 9.2 8.5 - 10.5 mg/dL    AST(SGOT) 24 12 - 45 U/L    ALT(SGPT) 16 2 - 50 U/L    Alkaline Phosphatase 100 (H) 30 - 99 U/L    Total Bilirubin 0.8 0.1 - 1.5 mg/dL    Albumin 4.1 3.2 - 4.9 g/dL    Total Protein 7.4 6.0 - 8.2 g/dL    Globulin 3.3 1.9 - 3.5 g/dL    A-G Ratio 1.2 g/dL   proBrain Natriuretic Peptide, NT    Collection Time: 06/10/25 10:56 AM   Result Value Ref Range    NT-proBNP 587 (H) 0 - 125 pg/mL   Troponin    Collection Time: 06/10/25 10:56 AM   Result Value Ref Range    Troponin T 19 6 - 19 ng/L   ESTIMATED GFR    Collection Time: 06/10/25 10:56 AM   Result Value Ref Range    GFR (CKD-EPI) 82 >60 mL/min/1.73 m 2   EKG    Collection Time: 06/10/25 11:14 AM   Result Value Ref Range    Report       [facility] Emergency Dept.    Test Date:  2025-06-10  Pt Name:    PABLO RILEY              Department: ER  MRN:        [id number]                      Room:  Gender:     Female                       Technician: [id number]  :        1950                   Requested By:ER TRIAGE PROTOCOL  Order #:    [id number]                    Reading MD: PENG ARGUELLES MD    Measurements  Intervals                                Axis  Rate:       74                           P:          8  NV:         183                          QRS:        41  QRSD:       87                           T:          41  QT:         398  QTc:        442    Interpretive Statements  Sinus rhythm  Compared to ECG 2023 09:04:56  Left ventricular hypertrophy no longer present  Electronically Signed On 06- 11:14:48 PDT by PENG ARGUELLES MD     POC CoV-2, FLU A/B, RSV by PCR    Collection Time: 06/10/25 11:32 AM   Result Value Ref Range    POC Influenza A RNA, PCR Negative Negative    POC Influenza B RNA, PCR Negative Negative    POC RSV, by PCR Negative Negative    POC SARS-CoV-2, PCR NotDetected NotDetected      I have independently interpreted this EKG as documented above    Rhythm strip interpretation-sinus rhythm    RADIOLOGY    Radiology report  DX-CHEST-PORTABLE (1 VIEW)   Final Result      Mildly increased interstitial infiltrates and/or edema.            COURSE & MEDICAL DECISION MAKING    INITIAL ASSESSMENT, COURSE AND PLAN  Case narrative: Patient presents with shortness of breath.  She describes orthopnea and PND.  She has had some vague chest pain but no chest pain currently.  There is no fever.  She has been coughing history of asthma but there is no bronchospasm on her examination.  Has a minimal scattered crackles.  Ordered laboratory data and EKG.    EKG without ischemia chest x-ray shows    Infiltrate and/or edema.  Laboratory data was unremarkable with only minimally elevated BNP.      I would like to  admit to hospital for evaluation possible CHF.I discussed case with Dr. Crespo.        Patient developed a slight oxygen requirement 1 L nasal cannula.    Interventions  Medications   furosemide (Lasix) injection 20 mg (has no administration in time range)         DISPOSITION AND DISCUSSIONS  I have discussed management of the patient with the following physicians and MC's:  as noted above      FINAL IMPRESSION  1.  Congestive heart failure    This dictation was created using voice recognition software. The accuracy of the dictation is limited to the abilities of the software. I expect there may be some errors of grammar and possibly content. The nursing notes were reviewed and certain aspects of this information were incorporated into this note.    Electronically signed by: Damaso Martin M.D., 6/10/2025             [1]   Past Medical History:  Diagnosis Date    Anxiety     Asthma    [2]   Social History  Tobacco Use    Smoking status: Never    Smokeless tobacco: Never   Vaping Use    Vaping status: Never Used   Substance Use Topics    Alcohol use: Not Currently     Comment: occasional    Drug use: Not Currently     Types: Inhaled     Comment: occasional   [3] No Known Allergies

## 2025-06-10 NOTE — ED NOTES
Medication history reviewed with PT at bedside    [facility] is complete per PT reporting    Allergies reviewed.     Patient denies any outpatient antibiotics in the last 30 days.     Patient is not taking anticoagulants.    Dispense history is partially available in [employer].    Preferred pharmacy for this encounter - [facility] [address] ([phone])

## 2025-06-11 ENCOUNTER — PHARMACY VISIT (OUTPATIENT)
Dept: PHARMACY | Facility: MEDICAL CENTER | Age: 75
End: 2025-06-11
Payer: COMMERCIAL

## 2025-06-11 VITALS
HEIGHT: 61 IN | BODY MASS INDEX: 29.97 KG/M2 | WEIGHT: 158.73 LBS | HEART RATE: 73 BPM | TEMPERATURE: 98.2 F | RESPIRATION RATE: 18 BRPM | DIASTOLIC BLOOD PRESSURE: 89 MMHG | SYSTOLIC BLOOD PRESSURE: 141 MMHG | OXYGEN SATURATION: 93 %

## 2025-06-11 LAB
ANION GAP SERPL CALC-SCNC: 13 MMOL/L (ref 7–16)
BUN SERPL-MCNC: 16 MG/DL (ref 8–22)
CALCIUM SERPL-MCNC: 9.3 MG/DL (ref 8.5–10.5)
CHLORIDE SERPL-SCNC: 108 MMOL/L (ref 96–112)
CO2 SERPL-SCNC: 22 MMOL/L (ref 20–33)
CREAT SERPL-MCNC: 0.86 MG/DL (ref 0.5–1.4)
GFR SERPLBLD CREATININE-BSD FMLA CKD-EPI: 70 ML/MIN/1.73 M 2
GLUCOSE SERPL-MCNC: 97 MG/DL (ref 65–99)
NT-PROBNP SERPL IA-MCNC: 503 PG/ML (ref 0–125)
POTASSIUM SERPL-SCNC: 3.8 MMOL/L (ref 3.6–5.5)
SODIUM SERPL-SCNC: 143 MMOL/L (ref 135–145)

## 2025-06-11 PROCEDURE — A9270 NON-COVERED ITEM OR SERVICE: HCPCS | Mod: UD | Performed by: HOSPITALIST

## 2025-06-11 PROCEDURE — RXMED WILLOW AMBULATORY MEDICATION CHARGE: Performed by: HOSPITALIST

## 2025-06-11 PROCEDURE — 99239 HOSP IP/OBS DSCHRG MGMT >30: CPT | Performed by: HOSPITALIST

## 2025-06-11 PROCEDURE — 83880 ASSAY OF NATRIURETIC PEPTIDE: CPT

## 2025-06-11 PROCEDURE — 80048 BASIC METABOLIC PNL TOTAL CA: CPT

## 2025-06-11 PROCEDURE — 700102 HCHG RX REV CODE 250 W/ 637 OVERRIDE(OP): Mod: UD | Performed by: HOSPITALIST

## 2025-06-11 PROCEDURE — 700111 HCHG RX REV CODE 636 W/ 250 OVERRIDE (IP): Mod: JZ,UD | Performed by: HOSPITALIST

## 2025-06-11 PROCEDURE — 36415 COLL VENOUS BLD VENIPUNCTURE: CPT

## 2025-06-11 PROCEDURE — 96372 THER/PROPH/DIAG INJ SC/IM: CPT

## 2025-06-11 PROCEDURE — 96376 TX/PRO/DX INJ SAME DRUG ADON: CPT

## 2025-06-11 PROCEDURE — G0378 HOSPITAL OBSERVATION PER HR: HCPCS

## 2025-06-11 RX ORDER — METOPROLOL SUCCINATE 25 MG/1
25 TABLET, EXTENDED RELEASE ORAL
Status: DISCONTINUED | OUTPATIENT
Start: 2025-06-11 | End: 2025-06-11 | Stop reason: HOSPADM

## 2025-06-11 RX ORDER — FUROSEMIDE 20 MG/1
20 TABLET ORAL DAILY
Qty: 60 TABLET | Refills: 0 | Status: SHIPPED | OUTPATIENT
Start: 2025-06-11

## 2025-06-11 RX ORDER — METOPROLOL TARTRATE 25 MG/1
25 TABLET, FILM COATED ORAL 2 TIMES DAILY
Status: DISCONTINUED | OUTPATIENT
Start: 2025-06-11 | End: 2025-06-11

## 2025-06-11 RX ORDER — POTASSIUM CHLORIDE 1500 MG/1
20 TABLET, EXTENDED RELEASE ORAL DAILY
Qty: 60 TABLET | Refills: 0 | Status: SHIPPED | OUTPATIENT
Start: 2025-06-11

## 2025-06-11 RX ORDER — METOPROLOL SUCCINATE 25 MG/1
25 TABLET, EXTENDED RELEASE ORAL DAILY
Qty: 90 TABLET | Refills: 0 | Status: SHIPPED | OUTPATIENT
Start: 2025-06-11

## 2025-06-11 RX ADMIN — FUROSEMIDE 20 MG: 10 INJECTION, SOLUTION INTRAVENOUS at 05:35

## 2025-06-11 RX ADMIN — METOPROLOL SUCCINATE 25 MG: 25 TABLET, EXTENDED RELEASE ORAL at 08:59

## 2025-06-11 RX ADMIN — POTASSIUM CHLORIDE 20 MEQ: 1500 TABLET, EXTENDED RELEASE ORAL at 05:35

## 2025-06-11 ASSESSMENT — PAIN DESCRIPTION - PAIN TYPE: TYPE: ACUTE PAIN

## 2025-06-11 ASSESSMENT — FIBROSIS 4 INDEX: FIB4 SCORE: 2.09

## 2025-06-11 NOTE — CARE PLAN
The patient is Stable - Low risk of patient condition declining or worsening    Shift Goals  Clinical Goals: Monitor respiratory status  Patient Goals: Feel better  Family Goals: not present    Problem: Pain - Standard  Goal: Alleviation of pain or a reduction in pain to the patient’s comfort goal  Description: Target End Date:  Prior to discharge or change in level of careDocument on Vitals flowsheet1.  Document pain using the appropriate pain scale per order or unit policy2.  Educate and implement non-pharmacologic comfort measures (i.e. relaxation, distraction, massage, cold/heat therapy, etc.)3.  Pain management medications as ordered4.  Reassess pain after pain med administration per policy5.  If opiods administered assess patient's response to pain medication is appropriate per POSS sedation scale6.  Follow pain management plan developed in collaboration with patient and interdisciplinary team (including palliative care or pain specialists if applicable)  Outcome: Progressing     Problem: Knowledge Deficit - Standard  Goal: Patient and family/care givers will demonstrate understanding of plan of care, disease process/condition, diagnostic tests and medications  Description: Target End Date:  1-3 days or as soon as patient condition allowsDocument in Patient Education1.  Patient and family/caregiver oriented to unit, equipment, visitation policy and means for communicating concern2.  Complete/review Learning Assessment3.  Assess knowledge level of disease process/condition, treatment plan, diagnostic tests and medications4.  Explain disease process/condition, treatment plan, diagnostic tests and medications  Outcome: Progressing     Problem: Respiratory:  Goal: Respiratory status will improve  Outcome: Progressing     Problem: Fall Risk  Goal: Patient will remain free from falls  Description: Target End Date:  Prior to discharge or change in level of care    Document interventions on the [facility] Fall Risk  Assessment    1.  Assess for fall risk factors  2.  Implement fall precautions  Outcome: Progressing

## 2025-06-11 NOTE — CARE PLAN
The patient is Stable - Low risk of patient condition declining or worsening    Shift Goals  Clinical Goals: safety, echo, monitor respiratory status  Patient Goals: rest, feel better  Family Goals: not present    Progress made toward(s) clinical / shift goals:    Problem: Pain - Standard  Goal: Alleviation of pain or a reduction in pain to the patient’s comfort goal  Description: Target End Date:  Prior to discharge or change in level of careDocument on Vitals flowsheet1.  Document pain using the appropriate pain scale per order or unit policy2.  Educate and implement non-pharmacologic comfort measures (i.e. relaxation, distraction, massage, cold/heat therapy, etc.)3.  Pain management medications as ordered4.  Reassess pain after pain med administration per policy5.  If opiods administered assess patient's response to pain medication is appropriate per POSS sedation scale6.  Follow pain management plan developed in collaboration with patient and interdisciplinary team (including palliative care or pain specialists if applicable)  Outcome: Progressing     Problem: Knowledge Deficit - Standard  Goal: Patient and family/care givers will demonstrate understanding of plan of care, disease process/condition, diagnostic tests and medications  Description: Target End Date:  1-3 days or as soon as patient condition allowsDocument in Patient Education1.  Patient and family/caregiver oriented to unit, equipment, visitation policy and means for communicating concern2.  Complete/review Learning Assessment3.  Assess knowledge level of disease process/condition, treatment plan, diagnostic tests and medications4.  Explain disease process/condition, treatment plan, diagnostic tests and medications  Outcome: Progressing     Problem: Respiratory:  Goal: Respiratory status will improve  Outcome: Progressing

## 2025-06-11 NOTE — PROGRESS NOTES
Received bedside report from Shu HUGHES at this time, assumed care of pt. Echo at bedside. Call light in reach. Bed in locked position. Plan of care discussed with pt. Pt agreeing to plan of care. Communication board updated. All questions answered. Assessment completed. Pt has no concerns at this time.

## 2025-06-11 NOTE — PROGRESS NOTES
Monitor summary: SR 65-78, OK 0.19, QRS 0.07, QT 0.37, with rare PVCs, rare PACs, and 12 seconds of PSVT with HR up to 170 per strip from monitor room.

## 2025-06-11 NOTE — PROGRESS NOTES
Discharge instructions completed with patient and patient son. All questions answered. Patient stated understanding of need for follow up appointment and when to seek emergency medical attention. Meds to beds verified with patient. Patient ambulated off unit with son.

## 2025-06-11 NOTE — PROGRESS NOTES
Patient is discharged to home. Her son is here to take her. She states feeling good to go. She is continent of bowel and bladder and is not on any oxygen. She will be going to the discharge lounge.

## 2025-06-11 NOTE — DISCHARGE SUMMARY
Discharge Summary    CHIEF COMPLAINT ON ADMISSION  Chief Complaint   Patient presents with    Shortness of Breath     Pt reports SOB since last night. +chest pain       Reason for Admission  SOB     Admission Date  6/10/2025    CODE STATUS  Full Code    HPI & HOSPITAL COURSE         History of Presenting Illness  Yang Galeas is a 75 y.o. female who presented 6/10/2025 with past medical of asthma who presents with complaints of shortness of breath.  Patient says that for many years she has had history of asthma and can deal with chronic shortness of breath however for the last couple days her shortness of breath has gotten worse.  Albuterol inhaler not very infected.  She was evaluated emergency department at [facility].  She was not hypoxic she was not tachycardic however her x-ray showed evidence of edema and she had an elevated BNP.  Patient referred to me for the care and management.  Patient denies PND but did admit to orthopnea.     Patient received IV Lasix in the emergency department and did noted increased urinary frequency and improvement in her shortness of breath.     Patient referred to me for further care and management       .  Management included diuresis with IV Lasix.  She had an echocardiogram that showed evidence of mild aortic stenosis, moderate aortic insufficiency and mitral valve insufficiency.    Patient had clinical improvement with diuresis.  Patient was cleared for discharge home with the below medication and to follow-up with cardiology outpatient setting     Therefore, she is discharged in good and stable condition to home with close outpatient follow-up.    The patient recovered much more quickly than anticipated on admission.    Discharge Date  6/11/2025    FOLLOW UP ITEMS POST DISCHARGE  [facility] cardiology    DISCHARGE DIAGNOSES  Principal Problem:    Dyspnea (POA: Yes)  Active Problems:    Mild intermittent asthma without complication (POA: Yes)  Resolved Problems:    * No resolved  hospital problems. *      FOLLOW UP  Future Appointments   Date Time Provider Department Center   7/11/2025 11:15 AM Zachary Woods M.D. [facility]  [address] [facility] [address]  [phone]  Go on 6/17/2025  Arrive at 8:00am for ongoing heart failure treatment. This is a walk-in clinic, You can go anytime between 8am and 4pm. Patients are seen on a first come, first served basis, wait times may vary. This clinic offers a sliding-fee scale.      MEDICATIONS ON DISCHARGE     Medication List        START taking these medications        Instructions   furosemide 20 MG Tabs  Commonly known as: Lasix   Take 1 Tablet by mouth every day.  Dose: 20 mg     metoprolol SR 25 MG Tb24  Commonly known as: Toprol XL   Take 1 Tablet by mouth every day.  Dose: 25 mg     potassium chloride SA 20 MEQ Tbcr  Commonly known as: Kdur   Take 1 Tablet by mouth every day.  Dose: 20 mEq            CONTINUE taking these medications        Instructions   Acetaminophen Extra Strength 500 MG Tabs   Take 1-2 Tablets by mouth every 6 hours as needed for Mild Pain.  Dose: 500-1,000 mg     albuterol 108 (90 Base) MCG/ACT Aers inhalation aerosol   Inhale 2 Puffs every 6 hours as needed for Shortness of Breath.  Dose: 2 Puff     ibuprofen 200 MG Tabs  Commonly known as: Motrin   Take 800 mg by mouth every 6 hours as needed for Mild Pain.  Dose: 800 mg              Allergies  Allergies[1]    DIET  Orders Placed This Encounter   Procedures    Diet Order Diet: Cardiac     Standing Status:   Standing     Number of Occurrences:   1     Diet::   Cardiac [6]       ACTIVITY  As tolerated.  Weight bearing as tolerated    CONSULTATIONS  \    PROCEDURES  Echo      Images     Show images for EC-ECHOCARDIOGRAM COMPLETE W/O CONT  EC-ECHOCARDIOGRAM COMPLETE W/O CONT  Order: [id number]   Status: Final result       Next appt: 07/11/2025 at 11:15 AM in Orthopedics (Zachary Woods M.D.)    Test Result Released: No  (inaccessible in MyChart)    0 Result Notes  Details    Reading Physician Reading Date Result Priority   No Reading Provider Prelim 6/10/2025    Gregorio Harry M.D.  [phone] 6/10/2025      Result Text  Transthoracic  Echo Report        Echocardiography Laboratory     CONCLUSIONS  Normal left ventricular systolic function. The left ventricular   ejection fraction is visually estimated to be 60%.  Normal right ventricular size and systolic function.  Moderate mitral stenosis. Mean transvalvular gradient is 8.0  mmHg at a   heart rate of 74  BPM.   Mild aortic valve stenosis. Transvalvular gradients are - Peak: 16    mmHg, Mean: 11  mmHg. Dimensionless index is 0.8   Moderate aortic insufficiency. Pressure half time is 446  msec. .  No prior study is available for comparison.   Primary team is notified of findings.     PABLO RILEY  Exam Date:         06/10/2025                      18:32  Exam Location:     Inpatient  Priority:          Routine     Ordering Physician:        BELINDA BUSH  Referring Physician:       RACHELLE Lorenz  Sonographer:               Pritesh Zambrano                              [facility], [facility]     Age:    75     Gender:    F  MRN:    [id number]  :    1950  BSA:    1.74   Ht (in):    61     Wt (lb):    165  Exam Type:     Complete     Indications:     Shortness of breath  ICD Codes:       786.05     CPT Codes:       29377     BP:   173    /   81     HR:   69  Technical Quality:       Technically difficult study -                            adequate information is obtained     MEASUREMENTS  (Male / Female) Normal Values              * Indicates values subject to auto-interpretation  LV EF:        %     FINDINGS  Left Ventricle  Normal left ventricular chamber size. Normal left ventricular wall   thickness. Normal left ventricular systolic function. The left   ventricular ejection fraction is visually estimated to be 60%. No   regional wall motion abnormalities. Indeterminate  diastolic function.     Right Ventricle  Normal right ventricular size and systolic function.     Right Atrium  Normal right atrial size. Normal inferior vena cava size and   inspiratory collapse.     Left Atrium  Normal left atrial size. Left atrial volume index is  23 mL/sq m.     Mitral Valve  Moderate mitral annular calcification. Moderate mitral stenosis. Mean   transvalvular gradient is 8.0  mmHg at a heart rate of 74  BPM.   Moderate to severe mitral regurgitation. ERO by PISA method is 2.9  sq   cm.     Aortic Valve  Calcified aortic valve leaflets. Mild aortic valve stenosis.   Transvalvular gradients are - Peak: 16  mmHg, Mean: 11  mmHg.   Dimensionless index is 0.8   Moderate aortic insufficiency. Pressure half time is 446  msec. .     Tricuspid Valve  Structurally normal tricuspid valve. No tricuspid stenosis. Trace   tricuspid regurgitation. Unable to estimate pulmonary artery pressure   due to an inadequate tricuspid regurgitant jet. Right atrial pressure   is estimated to be 3 mmHg.     Pulmonic Valve  The pulmonic valve is not well visualized. No pulmonic stenosis. No   pulmonic insufficiency.     Pericardium  Normal pericardium without effusion.     Aorta  The ascending aorta diameter is  3.8 cm.                                Gregorio Harry MD  (Electronically Signed)  Final Date:     10 Brooke 2025                   20:09       LABORATORY  Lab Results   Component Value Date    SODIUM 143 06/11/2025    POTASSIUM 3.8 06/11/2025    CHLORIDE 108 06/11/2025    CO2 22 06/11/2025    GLUCOSE 97 06/11/2025    BUN 16 06/11/2025    CREATININE 0.86 06/11/2025        Lab Results   Component Value Date    WBC 9.9 06/10/2025    HEMOGLOBIN 13.0 06/10/2025    HEMATOCRIT 39.5 06/10/2025    PLATELETCT 218 06/10/2025        Total time of the discharge process exceeds 36 minutes.       [1] No Known Allergies